# Patient Record
Sex: FEMALE | Race: WHITE | Employment: OTHER | URBAN - METROPOLITAN AREA
[De-identification: names, ages, dates, MRNs, and addresses within clinical notes are randomized per-mention and may not be internally consistent; named-entity substitution may affect disease eponyms.]

---

## 2018-01-31 ENCOUNTER — HOSPITAL ENCOUNTER (OUTPATIENT)
Dept: WOUND CARE | Age: 63
Discharge: HOME | End: 2018-01-31
Attending: PODIATRIST | Admitting: PODIATRIST

## 2018-01-31 VITALS — DIASTOLIC BLOOD PRESSURE: 78 MMHG | TEMPERATURE: 97.4 F | HEART RATE: 85 BPM | SYSTOLIC BLOOD PRESSURE: 107 MMHG

## 2018-01-31 RX ORDER — CLINDAMYCIN PHOSPHATE 10 MG/G
GEL TOPICAL 2 TIMES DAILY
COMMUNITY
End: 2018-04-27

## 2018-01-31 RX ORDER — GINSENG 100 MG
CAPSULE ORAL 2 TIMES DAILY
COMMUNITY
End: 2019-01-03 | Stop reason: ALTCHOICE

## 2018-01-31 RX ORDER — ACETAMINOPHEN 650 MG
TABLET, EXTENDED RELEASE ORAL
Qty: 59 BOTTLE | Refills: 1 | Status: SHIPPED | OUTPATIENT
Start: 2018-01-31 | End: 2018-01-31 | Stop reason: HOSPADM

## 2018-01-31 RX ORDER — ACETAMINOPHEN 650 MG
TABLET, EXTENDED RELEASE ORAL
Qty: 59 BOTTLE | Refills: 1 | Status: SHIPPED | OUTPATIENT
Start: 2018-01-31 | End: 2018-02-01

## 2018-01-31 NOTE — PROGRESS NOTES
Toña   Progress Note and Procedure Note      Ludwig Barbosa  AGE: 65 y.o. GENDER: female  : 1955  TODAY'S DATE:  2018    Subjective:     Chief Complaint   Patient presents with    Wound Check     left foot heel >30Days         HISTORY of PRESENT ILLNESS HPI     Ludwig Barbosa is a 65 y.o. female who presents today for wound evaluation. History of Wound: Patient is brought here today by a caregiver. They noticed a new wound in the past week. I think it's from being in bed without wearing the offloading boots or keeping the heel off the bed. Wound Pain:  none  Severity:  0 / 10   Wound Type:  pressure and traumatic  Modifying Factors:  venous stasis, lymphedema and decreased mobility  Associated Signs/Symptoms:  edema        PAST MEDICAL HISTORY        Diagnosis Date    Cerebral palsy (HCC)     Constipation     Dysphagia     mechanical soft diet, nectar thick liquids    Fracture     left leg brace per sister 2014    Intractable epilepsy ([facility] [place] [address]     Mental retardation     Osteoporosis     Recurrent UTI     Seizure disorder ([facility] [place] [address]     seizures well controlled( last seizure )    Wheelchair bound        ALLERGIES    Allergies   Allergen Reactions    Macrobid [Nitrofurantoin Monohydrate Macrocrystals]     Neosporin [Neomycin-Polymyxin-Gramicidin]     Nitrofurantoin     Nitrofurantoin Macrocrystal     Penicillins     Sulfa Antibiotics        MEDICATIONS    Current Outpatient Prescriptions on File Prior to Encounter   Medication Sig Dispense Refill    ferrous sulfate 325 (65 FE) MG tablet Take 325 mg by mouth daily (with breakfast)      omeprazole (PRILOSEC) 40 MG capsule Take 40 mg by mouth daily.  calcium carbonate (OSCAL) 500 MG TABS tablet Take 500 mg by mouth 2 times daily.  Potassium Chloride ER 20 MEQ TBCR Take 20 mEq by mouth daily.       citalopram (CELEXA) 10 MG tablet Take 20 mg by mouth daily       divalproex (DEPAKOTE SPRINKLE) 125 MG capsule Take 2 capsules by mouth 2 times daily. 60 capsule 1    diazepam (DIASTAT ACUDIAL) 10 MG GEL Place  rectally once as needed. UP TO 15 MG RECTALLY PRN, any seizure greater than 5 min       chlorhexidine (PERIDEX) 0.12 % solution Take 15 mLs by mouth 2 times daily.  furosemide (LASIX) 40 MG tablet Take 40 mg by mouth daily.  acyclovir (ZOVIRAX) 5 % ointment Apply topically every 3 hours Apply topically every 3 hours.  dicyclomine (BENTYL) 20 MG tablet Take 20 mg by mouth 2 times daily.  guaiFENesin (MUCINEX) 600 MG SR tablet Take 1,200 mg by mouth every 12 hours as needed for Congestion.  ibuprofen (IBU) 600 MG tablet Take 1 tablet by mouth every 8 hours as needed for Pain. 20 tablet 0    acetaminophen (TYLENOL) 325 MG tablet Take 650 mg by mouth every 6 hours as needed.  NYSTATIN IN AQUAPHOR OINTMENT Apply  topically 2 times daily as needed.  Dextromethorphan-Guaifenesin (ROBITUSSIN DM PO) Take 10 mLs by mouth every 4 hours as needed.  docusate sodium (COLACE) 100 MG capsule Take 100 mg by mouth 2 times daily.  polyethylene glycol (GLYCOLAX) powder Take 17 g by mouth 2 times daily. MIX 1 TABLESPOON IN JUICE       No current facility-administered medications on file prior to encounter. REVIEW OF SYSTEMS    Pertinent items are noted in HPI. Objective:        /78   Pulse 85   Temp 97.4 °F (36.3 °C) (Oral)     PHYSICAL EXAM    Vascular: Vascular status Intact  palpable pedal pulses, right DP1/4 and PT1/4, left DP1/4 and PT1/4. CFT 3 seconds digits 1 to 5 bilateral.  Hair growthAbsent  both lower extremities and feet. Skin temperature is warm to warm from pretibial area to distal digits bilateral.  Exam is negative for rubor, pallor, cyanosis or signs of acute vascular compromise bilaterally. Exam is positive for edema bilateral lower extremity. Varicosities Absent  bilateral lower extremity.    Neuro: Neurologic status Difficult to assess due to patient's mental status. Patient does respond to touch. There were no reproducible neuritic symptoms on exam bilateral feet/ankles. Derm: Ulceration to left Foot. Ecchymosis Absent  bilateral feet/foot. Musculoskeletal: no discomfort with debridement of wound 5/5 muscle strength in/eversion and dorsi/plantarflexion bilateral feet. No gross instability noted. Wound Care Documentation:  Wound 01/31/18 Other (Comment) Heel Left #2 (Active)   Wound Image   1/31/2018  2:56 PM   Wound Type Wound 1/31/2018  2:56 PM   Wound Pressure Stage  2 1/31/2018  2:56 PM   Dressing/Treatment Dry dressing; Foam 1/31/2018  2:56 PM   Wound Cleansed Rinsed/Irrigated with saline 1/31/2018  2:56 PM   Wound Length (cm) 0.7 cm 1/31/2018  2:56 PM   Wound Width (cm) 1.2 cm 1/31/2018  2:56 PM   Wound Depth (cm)  0.1 1/31/2018  2:56 PM   Calculated Wound Size (cm^2) (l*w) 0.84 cm^2 1/31/2018  2:56 PM   Wound Assessment Pink 1/31/2018  2:56 PM   Drainage Amount None 1/31/2018  2:56 PM   Sharla-wound Assessment Purple 1/31/2018  2:56 PM   [place]%Wound Bed 100 1/31/2018  2:56 PM   Red%Wound Bed 0 1/31/2018  2:56 PM   Yellow%Wound Bed 0 1/31/2018  2:56 PM   Black%Wound Bed 0 1/31/2018  2:56 PM   Purple%Wound Bed 0 1/31/2018  2:56 PM   Other%Wound Bed 0 1/31/2018  2:56 PM   Op First Treatment Date 01/31/18 1/31/2018  2:56 PM   Number of days: 0     Assessment: Pressure ulceration posterior heel, mild PAD  Plan:   Dry necrosis. Wound is stable  Follow-up 1 week  Dry sterile dressing with Betadine ointment  Recommend foam over anterior ankle when wearing Ace wrap.     Written Patient Discharge Instructions Given            Electronically signed by Maren Morton DPM on 1/31/2018 at 5:13 PM

## 2018-02-01 RX ORDER — ACETAMINOPHEN 650 MG
1 TABLET, EXTENDED RELEASE ORAL DAILY
COMMUNITY
End: 2018-02-01 | Stop reason: HOSPADM

## 2018-02-01 RX ORDER — ACETAMINOPHEN 650 MG
TABLET, EXTENDED RELEASE ORAL
Qty: 1 BOTTLE | Refills: 1 | Status: SHIPPED | OUTPATIENT
Start: 2018-02-01 | End: 2018-02-08

## 2018-02-09 ENCOUNTER — HOSPITAL ENCOUNTER (OUTPATIENT)
Dept: WOUND CARE | Age: 63
Discharge: HOME | End: 2018-02-09
Attending: PODIATRIST | Admitting: PODIATRIST

## 2018-02-09 VITALS — DIASTOLIC BLOOD PRESSURE: 78 MMHG | HEART RATE: 89 BPM | RESPIRATION RATE: 16 BRPM | SYSTOLIC BLOOD PRESSURE: 118 MMHG

## 2018-02-09 NOTE — PROGRESS NOTES
wearing Ace wrap.     Written Patient Discharge Instructions Given            Electronically signed by Tanna Whatley DPM on 2/9/2018 at 11:42 AM

## 2018-02-16 ENCOUNTER — HOSPITAL ENCOUNTER (OUTPATIENT)
Dept: WOUND CARE | Age: 63
Discharge: HOME | End: 2018-02-16
Attending: PODIATRIST | Admitting: PODIATRIST

## 2018-02-16 VITALS — DIASTOLIC BLOOD PRESSURE: 68 MMHG | SYSTOLIC BLOOD PRESSURE: 107 MMHG | HEART RATE: 75 BPM

## 2018-02-16 RX ORDER — GENTAMICIN SULFATE 1 MG/G
CREAM TOPICAL
Qty: 1 TUBE | Refills: 1 | Status: SHIPPED | OUTPATIENT
Start: 2018-02-16 | End: 2018-04-27

## 2018-02-16 RX ORDER — LIDOCAINE HYDROCHLORIDE 40 MG/ML
SOLUTION TOPICAL ONCE
Status: DISCONTINUED | OUTPATIENT
Start: 2018-02-16 | End: 2018-02-17 | Stop reason: HOSPADM

## 2018-02-16 NOTE — PROGRESS NOTES
[facility]  Progress Note and Procedure Note      Sandoval Escalante  AGE: 65 y.o. GENDER: female  : 1955  TODAY'S DATE:  2018    Subjective:     Chief Complaint   Patient presents with    Wound Check     left foot F/U         HISTORY of PRESENT ILLNESS HPI     Sandoval Shown is a 65 y.o. female who presents today for wound evaluation. History of Wound: Patient is brought here today by a caregiver. They have been using the offloading boot as directed. They've been changing the bandage every day with application of Betadine paint. There been no issues. The patient is not in distress. Wound Pain:  none  Severity:  0 / 10   Wound Type:  pressure and traumatic  Modifying Factors:  venous stasis, lymphedema and decreased mobility  Associated Signs/Symptoms:  edema        PAST MEDICAL HISTORY        Diagnosis Date    Cerebral palsy (HCC)     Constipation     Dysphagia     mechanical soft diet, nectar thick liquids    Fracture     left leg brace per sister 2014    Int[facility] [address]     Mental retardation     Osteoporosis     Recurrent UTI     Seizure disorder ([facility] [address]     seizures well controlled( last seizure )    Wheelchair bound        ALLERGIES    Allergies   Allergen Reactions    Macrobid [Nitrofurantoin Monohydrate Macrocrystals]     Neosporin [Neomycin-Polymyxin-Gramicidin]     Nitrofurantoin     Nitrofurantoin Macrocrystal     Penicillins     Sulfa Antibiotics        MEDICATIONS    Current Outpatient Prescriptions on File Prior to Encounter   Medication Sig Dispense Refill    clindamycin (CLINDAGEL) 1 % gel Apply topically 2 times daily Apply topically 2 times daily.  bacitracin 500 UNIT/GM ointment Apply topically 2 times daily Apply topically 2 times daily.       ferrous sulfate 325 (65 FE) MG tablet Take 325 mg by mouth daily (with breakfast)      acyclovir (ZOVIRAX) 5 % ointment Apply topically every 3 hours Apply topically every 3 hours.  dicyclomine (BENTYL) 20 MG tablet Take 20 mg by mouth 2 times daily.  omeprazole (PRILOSEC) 40 MG capsule Take 40 mg by mouth daily.  calcium carbonate (OSCAL) 500 MG TABS tablet Take 500 mg by mouth 2 times daily.  Potassium Chloride ER 20 MEQ TBCR Take 20 mEq by mouth daily.  guaiFENesin (MUCINEX) 600 MG SR tablet Take 1,200 mg by mouth every 12 hours as needed for Congestion.  citalopram (CELEXA) 10 MG tablet Take 20 mg by mouth daily       divalproex (DEPAKOTE SPRINKLE) 125 MG capsule Take 2 capsules by mouth 2 times daily. 60 capsule 1    NYSTATIN IN AQUAPHOR OINTMENT Apply  topically 2 times daily as needed.  Dextromethorphan-Guaifenesin (ROBITUSSIN DM PO) Take 10 mLs by mouth every 4 hours as needed.  diazepam (DIASTAT ACUDIAL) 10 MG GEL Place  rectally once as needed. UP TO 15 MG RECTALLY PRN, any seizure greater than 5 min       chlorhexidine (PERIDEX) 0.12 % solution Take 15 mLs by mouth 2 times daily.  docusate sodium (COLACE) 100 MG capsule Take 100 mg by mouth 2 times daily.  furosemide (LASIX) 40 MG tablet Take 40 mg by mouth daily.  polyethylene glycol (GLYCOLAX) powder Take 17 g by mouth 2 times daily. MIX 1 TABLESPOON IN JUICE      ibuprofen (IBU) 600 MG tablet Take 1 tablet by mouth every 8 hours as needed for Pain. 20 tablet 0    acetaminophen (TYLENOL) 325 MG tablet Take 650 mg by mouth every 6 hours as needed. No current facility-administered medications on file prior to encounter. REVIEW OF SYSTEMS    Pertinent items are noted in HPI. Objective:        /68   Pulse 75     PHYSICAL EXAM    Vascular: Vascular status Intact  palpable pedal pulses, right DP1/4 and PT1/4, left DP1/4 and PT1/4. CFT 3 seconds digits 1 to 5 bilateral.  Hair growthAbsent  both lower extremities and feet.   Skin temperature is warm to warm from pretibial area to distal digits bilateral.  Exam is negative for rubor, Yellow%Wound Bed 0 2/16/2018 10:43 AM   Black%Wound Bed 100 2/16/2018 10:43 AM   Purple%Wound Bed 0 2/16/2018 10:43 AM   Other%Wound Bed 0 2/16/2018 10:43 AM   Time out Yes 2/16/2018 10:54 AM   Op First Treatment Date 01/31/18 1/31/2018  2:56 PM   Number of days: 15       Total Surface Area Debrided:  3.78 sq cm     Percentage of wound debrided 100%    Bleeding:  Minimal    Hemostasis Achieved:  by pressure    Procedural Pain:  3  / 10     Post Procedural Pain:  0 / 10     Response to treatment:  Well tolerated by patient. Assessment: Pressure ulceration posterior heel, mild PAD  Plan:   Dry necrosis. Wound debrided today   Some bloody drainage noted. Gentamicin cream to heal with bandage daily   Recommend foam over anterior ankle when wearing Ace wrap.   Continue offloading to the heel  Follow-up in 1 week  Written Patient Discharge Instructions Given   daily dressing changes         Electronically signed by Lowell Ortega DPM on 2/16/2018 at 11:00 AM

## 2018-02-23 ENCOUNTER — HOSPITAL ENCOUNTER (OUTPATIENT)
Dept: WOUND CARE | Age: 63
Discharge: HOME | End: 2018-02-23
Attending: PODIATRIST | Admitting: PODIATRIST

## 2018-02-23 VITALS — TEMPERATURE: 98 F | DIASTOLIC BLOOD PRESSURE: 65 MMHG | HEART RATE: 94 BPM | SYSTOLIC BLOOD PRESSURE: 110 MMHG

## 2018-02-23 NOTE — PROGRESS NOTES
pretibial area to distal digits bilateral.  Exam is negative for rubor, pallor, cyanosis or signs of acute vascular compromise bilaterally. Exam is positive for edema bilateral lower extremity. Varicosities Absent  bilateral lower extremity. Neuro: Neurologic status Difficult to assess due to patient's mental status. Patient does respond to touch. There were no reproducible neuritic symptoms on exam bilateral feet/ankles. Derm: Ulceration to left Foot. Ecchymosis Absent  bilateral feet/foot. Musculoskeletal: no discomfort with debridement of wound 5/5 muscle strength in/eversion and dorsi/plantarflexion bilateral feet. No gross instability noted. Wound 01/31/18 Other (Comment) Heel Left #2 (Active)   Wound Image   1/31/2018  2:56 PM   Wound Type Wound 2/23/2018 11:01 AM   Wound Pressure Stage  2 2/23/2018 11:01 AM   Dressing/Treatment Foam;Dry dressing; Pharmaceutical agent (see eMar) 2/16/2018 11:16 AM   Wound Cleansed Rinsed/Irrigated with saline 2/23/2018 11:01 AM   Wound Length (cm) 0 cm 2/23/2018 11:01 AM   Wound Width (cm) 0 cm 2/23/2018 11:01 AM   Wound Depth (cm)  0 2/23/2018 11:01 AM   Calculated Wound Size (cm^2) (l*w) 0 cm^2 2/23/2018 11:01 AM   Change in Wound Size % (l*w) 100 2/23/2018 11:01 AM   Wound Assessment Dry;Black 2/23/2018 11:01 AM   Drainage Amount None 2/23/2018 11:01 AM   Odor None 2/16/2018 10:43 AM   Sharla-wound Assessment Pink 2/23/2018 11:01 AM   [place]%Wound Bed 0 2/23/2018 11:01 AM   Red%Wound Bed 0 2/23/2018 11:01 AM   Yellow%Wound Bed 0 2/23/2018 11:01 AM   Black%Wound Bed 100 2/23/2018 11:01 AM   Purple%Wound Bed 0 2/23/2018 11:01 AM   Other%Wound Bed 0 2/23/2018 11:01 AM   Time out Yes 2/16/2018 10:54 AM   Op First Treatment Date 01/31/18 1/31/2018  2:56 PM   Number of days: 22         Assessment: Pressure ulceration posterior heel, mild PAD  Plan:   Dry necrosis. Wound debrided today   Some bloody drainage noted.     Gentamicin cream to heal with bandage daily

## 2018-04-27 ENCOUNTER — HOSPITAL ENCOUNTER (OUTPATIENT)
Dept: WOUND CARE | Age: 63
Discharge: HOME | End: 2018-04-27
Attending: PODIATRIST | Admitting: PODIATRIST

## 2018-04-27 VITALS — HEART RATE: 87 BPM | TEMPERATURE: 97.6 F | DIASTOLIC BLOOD PRESSURE: 76 MMHG | SYSTOLIC BLOOD PRESSURE: 116 MMHG

## 2018-04-27 RX ORDER — GENTAMICIN SULFATE 1 MG/G
CREAM TOPICAL
Qty: 1 TUBE | Refills: 1 | Status: SHIPPED | OUTPATIENT
Start: 2018-04-27 | End: 2019-01-03 | Stop reason: ALTCHOICE

## 2018-04-27 RX ORDER — LIDOCAINE HYDROCHLORIDE 40 MG/ML
SOLUTION TOPICAL ONCE
Status: DISCONTINUED | OUTPATIENT
Start: 2018-04-27 | End: 2018-04-28 | Stop reason: HOSPADM

## 2018-05-17 ENCOUNTER — HOSPITAL ENCOUNTER (OUTPATIENT)
Dept: WOUND CARE | Age: 63
Discharge: HOME | End: 2018-05-17
Attending: INTERNAL MEDICINE | Admitting: INTERNAL MEDICINE

## 2018-05-17 VITALS — SYSTOLIC BLOOD PRESSURE: 112 MMHG | HEART RATE: 79 BPM | DIASTOLIC BLOOD PRESSURE: 69 MMHG

## 2018-05-17 DIAGNOSIS — L89.622 DECUBITUS ULCER OF LEFT HEEL, STAGE 2 (HCC): ICD-10-CM

## 2018-05-17 DIAGNOSIS — S31.109A OPEN WOUND OF ANTERIOR ABDOMINAL WALL, INITIAL ENCOUNTER: ICD-10-CM

## 2018-05-17 PROCEDURE — 11042 DBRDMT SUBQ TIS 1ST 20SQCM/<: CPT | Performed by: INTERNAL MEDICINE

## 2018-05-17 RX ORDER — LIDOCAINE HYDROCHLORIDE 40 MG/ML
SOLUTION TOPICAL ONCE
Status: DISCONTINUED | OUTPATIENT
Start: 2018-05-17 | End: 2018-05-18 | Stop reason: HOSPADM

## 2018-05-23 ENCOUNTER — HOSPITAL ENCOUNTER (OUTPATIENT)
Dept: WOUND CARE | Age: 63
Discharge: HOME | End: 2018-05-23
Attending: SURGERY | Admitting: SURGERY

## 2018-05-23 PROCEDURE — 11042 DBRDMT SUBQ TIS 1ST 20SQCM/<: CPT | Performed by: SURGERY

## 2018-05-23 RX ORDER — LIDOCAINE HYDROCHLORIDE 40 MG/ML
SOLUTION TOPICAL ONCE
Status: DISCONTINUED | OUTPATIENT
Start: 2018-05-23 | End: 2018-05-24 | Stop reason: HOSPADM

## 2018-06-13 ENCOUNTER — HOSPITAL ENCOUNTER (OUTPATIENT)
Dept: WOUND CARE | Age: 63
Discharge: HOME | End: 2018-06-13
Attending: SURGERY | Admitting: SURGERY

## 2018-06-13 VITALS
HEART RATE: 74 BPM | DIASTOLIC BLOOD PRESSURE: 52 MMHG | SYSTOLIC BLOOD PRESSURE: 91 MMHG | RESPIRATION RATE: 18 BRPM | TEMPERATURE: 97.5 F

## 2018-06-13 DIAGNOSIS — N13.30 HYDRONEPHROSIS: ICD-10-CM

## 2018-06-13 PROCEDURE — 11042 DBRDMT SUBQ TIS 1ST 20SQCM/<: CPT | Performed by: SURGERY

## 2018-06-13 RX ORDER — LIDOCAINE HYDROCHLORIDE 40 MG/ML
SOLUTION TOPICAL ONCE
Status: DISCONTINUED | OUTPATIENT
Start: 2018-06-13 | End: 2018-06-14 | Stop reason: HOSPADM

## 2018-06-26 ENCOUNTER — HOSPITAL ENCOUNTER (OUTPATIENT)
Dept: WOUND CARE | Age: 63
Discharge: HOME | End: 2018-06-26
Attending: SURGERY | Admitting: SURGERY

## 2018-06-26 VITALS — DIASTOLIC BLOOD PRESSURE: 61 MMHG | HEART RATE: 70 BPM | SYSTOLIC BLOOD PRESSURE: 100 MMHG

## 2018-06-26 DIAGNOSIS — N13.30 HYDRONEPHROSIS: ICD-10-CM

## 2018-06-26 PROCEDURE — 99212 OFFICE O/P EST SF 10 MIN: CPT | Performed by: SURGERY

## 2018-06-26 RX ORDER — LIDOCAINE HYDROCHLORIDE 40 MG/ML
SOLUTION TOPICAL ONCE
Status: DISCONTINUED | OUTPATIENT
Start: 2018-06-26 | End: 2018-06-27 | Stop reason: HOSPADM

## 2018-06-27 ENCOUNTER — HOSPITAL ENCOUNTER (OUTPATIENT)
Dept: CT IMAGING | Age: 63
Discharge: HOME | End: 2018-06-27
Attending: FAMILY MEDICINE | Admitting: FAMILY MEDICINE

## 2018-06-27 DIAGNOSIS — N13.30 HYDRONEPHROSIS, UNSPECIFIED HYDRONEPHROSIS TYPE: ICD-10-CM

## 2018-06-27 DIAGNOSIS — N13.30 HYDRONEPHROSIS: ICD-10-CM

## 2018-07-19 ENCOUNTER — HOSPITAL ENCOUNTER (OUTPATIENT)
Dept: WOUND CARE | Age: 63
Discharge: HOME | End: 2018-07-19
Attending: INTERNAL MEDICINE | Admitting: INTERNAL MEDICINE

## 2018-07-19 DIAGNOSIS — N13.30 HYDRONEPHROSIS: ICD-10-CM

## 2018-07-19 PROCEDURE — 11042 DBRDMT SUBQ TIS 1ST 20SQCM/<: CPT | Performed by: INTERNAL MEDICINE

## 2018-07-19 RX ORDER — LIDOCAINE HYDROCHLORIDE 40 MG/ML
SOLUTION TOPICAL ONCE
Status: DISCONTINUED | OUTPATIENT
Start: 2018-07-19 | End: 2018-07-20 | Stop reason: HOSPADM

## 2018-07-19 NOTE — PROGRESS NOTES
visit.    PHYSICAL EXAM    General Appearance: alert and oriented to person, place and time, well-developed and well-nourished, in no acute distress  Skin: warm and dry  Head: normocephalic and atraumatic  Eyes: extraocular eye movements intact and conjunctivae normal  Extremities: no cyanosis and no clubbing  Musculoskeletal: normal range of motion, no joint swelling, deformity or tenderness      Assessment:     Patient Active Problem List   Diagnosis    Dental disorder    Mental retardation    Cerebral palsy ([address]    Seizure disorder ([address]    Osteoporosis    Dysphagia    Status epilepticus, generalized convulsive ([address]    [facility]est[facility]) [facility]    Decubitus ulcer of left heel, stage 2    Wound, open, abdominal wall, anterior    Decubitus ulcer of left heel, stage 3 ([address]    Open wound of left heel       Procedure Note    Performed by: Otto Sharp DO    Consent obtained: Yes    Time out taken:  Yes    Pain Control: Anesthetic  Anesthetic: 4% Lidocaine Liquid Topical     Debridement:Excisional Debridement    Using curette the wound was sharply debrided    down through and including the removal of subcutaneous tissue.         Devitalized Tissue Debrided:  biofilm and slough    Pre Debridement Measurements:  Are located in the Wound Documentation Flow Sheet    Wound #: 3     Post  Debridement Measurements:  Wound 01/31/18 Heel Left #3 (Active)   Wound Image   6/26/2018 10:10 AM   Wound Type Incision 6/26/2018 10:10 AM   Wound Pressure Stage  2 6/26/2018 10:10 AM   Wound Cleansed Rinsed/Irrigated with saline 6/26/2018 10:10 AM   Wound Length (cm) 0 cm 6/26/2018 10:10 AM   Wound Width (cm) 0 cm 6/26/2018 10:10 AM   Wound Depth (cm)  0 6/26/2018 10:10 AM   Calculated Wound Size (cm^2) (l*w) 0 cm^2 6/26/2018 10:10 AM   Change in Wound Size % (l*w) 100 6/26/2018 10:10 AM   Wound Assessment Yellow 6/26/2018 10:10 AM   Drainage Amount None 6/26/2018 10:10 AM   Odor None 6/26/2018 10:10 AM Sharla-wound Assessment Black;Calloused 6/26/2018 10:10 AM   [place]%Wound Bed 0 6/26/2018 10:10 AM   Red%Wound Bed 0 6/26/2018 10:10 AM   Yellow%Wound Bed 100 6/26/2018 10:10 AM   Black%Wound Bed 0 6/26/2018 10:10 AM   Purple%Wound Bed 0 6/26/2018 10:10 AM   Other%Wound Bed 0 6/26/2018 10:10 AM   Number of days: 168       Incision 03/05/14 Mouth Lower (Active)   Number of days: 1596           Total Surface Area Debrided:  4 sq cm     Percentage of wound debrided 100%    Bleeding:  Minimal    Hemostasis Achieved:  by pressure    Procedural Pain:  3  / 10     Post Procedural Pain:  1 / 10     Response to treatment:  Well tolerated by patient. Plan:     The nature of the patient's condition was explained in depth. The patient was informed that their compliance to the treatment plan is paramount to successful healing and prevention of further ulceration and/or infection     Discharge Treatment      Written Patient Discharge Instructions Given    Wound: Left Heel     With each dressing change, rinse wounds with 0.9% Saline. (May use wound wash or soft contact solution. Both can be purchased at a local drug store). If unable to obtain saline, may use a gentle soap and water.     Dressing care: Left heel- Santyl, Dry dressing/Heel foam- change daily. Prevalon Boot. Do Not get foot wet in shower. Keep all pressure off of heel. Continue to wear the Prevalon Boot, place orange area to foot    Thank you for allowing me to participate in the care of your patient. Please do not hesitate to call.      Keyshawn Mcdowell D.O., [facility]  Interventional Cardiology     o: [phone]  [facility] [address], [address] [facility]

## 2018-08-03 ENCOUNTER — HOSPITAL ENCOUNTER (OUTPATIENT)
Dept: WOUND CARE | Age: 63
Discharge: HOME | End: 2018-08-03
Attending: PODIATRIST | Admitting: INTERNAL MEDICINE

## 2018-08-03 VITALS
TEMPERATURE: 98.6 F | HEART RATE: 94 BPM | SYSTOLIC BLOOD PRESSURE: 96 MMHG | DIASTOLIC BLOOD PRESSURE: 64 MMHG | RESPIRATION RATE: 18 BRPM

## 2018-08-03 RX ORDER — LIDOCAINE HYDROCHLORIDE 40 MG/ML
SOLUTION TOPICAL ONCE
Status: DISCONTINUED | OUTPATIENT
Start: 2018-08-03 | End: 2018-08-04 | Stop reason: HOSPADM

## 2018-08-10 ENCOUNTER — HOSPITAL ENCOUNTER (OUTPATIENT)
Dept: WOUND CARE | Age: 63
Discharge: HOME | End: 2018-08-10
Attending: PODIATRIST | Admitting: PODIATRIST

## 2018-08-10 VITALS — DIASTOLIC BLOOD PRESSURE: 60 MMHG | HEART RATE: 71 BPM | SYSTOLIC BLOOD PRESSURE: 102 MMHG | RESPIRATION RATE: 18 BRPM

## 2018-08-10 RX ORDER — LIDOCAINE HYDROCHLORIDE 40 MG/ML
SOLUTION TOPICAL ONCE
Status: DISCONTINUED | OUTPATIENT
Start: 2018-08-10 | End: 2018-08-11 | Stop reason: HOSPADM

## 2018-08-10 NOTE — PLAN OF CARE
Problem: Wound:  Goal: Will show signs of wound healing; wound closure and no evidence of infection  Will show signs of wound healing; wound closure and no evidence of infection   Outcome: Ongoing  Discharge instructions given. Patient verbalized understanding. Return to [facility] in 1 week.   Called/faxed orders to  [facility]    [] antibiotics    [] X-ray     [] Culture   [x] Debridement      [] HBO Evaluation    [] LABS   [] Vascular Studies []

## 2018-08-10 NOTE — PROGRESS NOTES
Yellow%Wound Bed 0 8/10/2018 10:55 AM   Black%Wound Bed 0 8/10/2018 10:55 AM   Purple%Wound Bed 0 8/10/2018 10:55 AM   Other%Wound Bed 0 8/10/2018 10:55 AM   Time out Yes 8/10/2018 11:19 AM   Op First Treatment Date 07/19/18 7/19/2018 10:22 AM   Number of days: 191       Incision 03/05/14 Mouth Lower (Active)   Number of days: 1619         Total Surface Area Debrided: 1.4sq cm     Percentage of wound debrided 100%    Bleeding:  Minimal    Hemostasis Achieved:  by pressure    Procedural Pain:  0  / 10     Post Procedural Pain:  0 / 10     Response to treatment:  Well tolerated by patient. Assessment: Pressure ulceration posterior heel, mild PAD  Plan:   Dry necrosis. Wound debrided today   Some bloody drainage noted.     Wound is granular And macerated, stopped Santyl  Collagen to wound every other day  foam over anterior ankle when wearing Ace wrap.  prevalon boot   Continue offloading to the heel  Follow-up in 1 week  Written Patient Discharge Instructions Given   daily dressing changes         Electronically signed by Betsy Rao DPM on 8/10/2018 at 5:05 PM

## 2018-08-31 ENCOUNTER — HOSPITAL ENCOUNTER (OUTPATIENT)
Dept: WOUND CARE | Age: 63
Discharge: HOME | End: 2018-08-31
Attending: PODIATRIST | Admitting: PODIATRIST

## 2018-08-31 RX ORDER — LIDOCAINE HYDROCHLORIDE 40 MG/ML
SOLUTION TOPICAL ONCE
Status: DISCONTINUED | OUTPATIENT
Start: 2018-08-31 | End: 2018-09-01 | Stop reason: HOSPADM

## 2018-08-31 NOTE — PROGRESS NOTES
mouth daily.  calcium carbonate (OSCAL) 500 MG TABS tablet Take 500 mg by mouth 2 times daily.  Potassium Chloride ER 20 MEQ TBCR Take 20 mEq by mouth daily.  guaiFENesin (MUCINEX) 600 MG SR tablet Take 1,200 mg by mouth every 12 hours as needed for Congestion.  citalopram (CELEXA) 10 MG tablet Take 20 mg by mouth daily       ibuprofen (IBU) 600 MG tablet Take 1 tablet by mouth every 8 hours as needed for Pain. 20 tablet 0    acetaminophen (TYLENOL) 325 MG tablet Take 650 mg by mouth every 6 hours as needed.  divalproex (DEPAKOTE SPRINKLE) 125 MG capsule Take 2 capsules by mouth 2 times daily. 60 capsule 1    NYSTATIN IN AQUAPHOR OINTMENT Apply  topically 2 times daily as needed.  Dextromethorphan-Guaifenesin (ROBITUSSIN DM PO) Take 10 mLs by mouth every 4 hours as needed.  diazepam (DIASTAT ACUDIAL) 10 MG GEL Place  rectally once as needed. UP TO 15 MG RECTALLY PRN, any seizure greater than 5 min       chlorhexidine (PERIDEX) 0.12 % solution Take 15 mLs by mouth 2 times daily.  docusate sodium (COLACE) 100 MG capsule Take 100 mg by mouth 2 times daily.  furosemide (LASIX) 40 MG tablet Take 40 mg by mouth daily.  polyethylene glycol (GLYCOLAX) powder Take 17 g by mouth 2 times daily. MIX 1 TABLESPOON IN JUICE       No current facility-administered medications on file prior to encounter. REVIEW OF SYSTEMS    Pertinent items are noted in HPI. Objective: There were no vitals taken for this visit. PHYSICAL EXAM    Vascular: Vascular status Intact  palpable pedal pulses, right DP1/4 and PT1/4, left DP1/4 and PT1/4. CFT 3 seconds digits 1 to 5 bilateral.  Hair growthAbsent  both lower extremities and feet. Skin temperature is warm to warm from pretibial area to distal digits bilateral.  Exam is negative for rubor, pallor, cyanosis or signs of acute vascular compromise bilaterally. Exam is positive for edema bilateral lower extremity. Black%Wound Bed 0 8/31/2018 10:53 AM   Purple%Wound Bed 0 8/31/2018 10:53 AM   Other%Wound Bed 0 8/31/2018 10:53 AM   Time out Yes 8/31/2018 11:22 AM   Op First Treatment Date 07/19/18 7/19/2018 10:22 AM   Number of days: 211       Incision 03/05/14 Mouth Lower (Active)   Number of days: 1640         Total Surface Area Debrided: 0.09sq cm     Percentage of wound debrided 100%    Bleeding:  Minimal    Hemostasis Achieved:  by pressure    Procedural Pain:  0  / 10     Post Procedural Pain:  0 / 10     Response to treatment:  Well tolerated by patient. Assessment: Pressure ulceration posterior heel, mild PAD    Plan:   Dry necrosis.   Wound debrided today   Wound is granular and smaller  Collagen to wound every other day  foam over anterior ankle when wearing Ace wrap.  prevalon boot   Continue offloading to the heel  Follow-up in 1 week  Written Patient Discharge Instructions Given   daily dressing changes         Electronically signed by Jessica Waldrop DPM on 8/31/2018 at 12:33 PM

## 2018-09-14 ENCOUNTER — HOSPITAL ENCOUNTER (OUTPATIENT)
Dept: WOUND CARE | Age: 63
Discharge: HOME | End: 2018-09-14
Attending: PODIATRIST | Admitting: PODIATRIST

## 2018-09-28 ENCOUNTER — HOSPITAL ENCOUNTER (OUTPATIENT)
Dept: WOUND CARE | Age: 63
Discharge: HOME | End: 2018-09-28
Payer: MEDICARE

## 2018-09-28 VITALS — HEART RATE: 72 BPM | DIASTOLIC BLOOD PRESSURE: 69 MMHG | SYSTOLIC BLOOD PRESSURE: 107 MMHG

## 2018-09-28 PROCEDURE — 11043 DBRDMT MUSC&/FSCA 1ST 20/<: CPT

## 2018-09-28 RX ORDER — LIDOCAINE HYDROCHLORIDE 40 MG/ML
SOLUTION TOPICAL ONCE
Status: DISCONTINUED | OUTPATIENT
Start: 2018-09-28 | End: 2018-09-29 | Stop reason: HOSPADM

## 2018-09-28 NOTE — PLAN OF CARE
Problem: Wound:  Goal: Will show signs of wound healing; wound closure and no evidence of infection  Will show signs of wound healing; wound closure and no evidence of infection   Outcome: Ongoing  Discharge instructions given. Patient verbalized understanding. Return to [facility] in 1 week.   Facility to order [employer]    [] antibiotics    [] X-ray     [] Culture   [x] Debridement      [] HBO Evaluation    [] LABS   [] Vascular Studies []

## 2018-09-28 NOTE — PROGRESS NOTES
[facility]  Progress Note and Procedure Note      Renetta Valladares  AGE: 65 y.o. GENDER: female  : 1955  TODAY'S DATE:  2018    Subjective:     Chief Complaint   Patient presents with    Wound Check     left foot  F/U         HISTORY of PRESENT ILLNESS HPI     Renetta Valladares is a 65 y.o. female who presents today for wound evaluation. History of Wound: Patient is brought here today by a caregiver. She states the wound nurses have been changing the bandage every other day. And that the wound is worse. There is concern that the offloading boot is not working anymore. Wound Pain:  none  Severity:  0 / 10   Wound Type:  pressure and traumatic  Modifying Factors:  venous stasis, lymphedema and decreased mobility  Associated Signs/Symptoms:  edema        PAST MEDICAL HISTORY        Diagnosis Date    Cerebral palsy (HCC)     Constipation     Dysphagia     mechanical soft diet, nectar thick liquids    Fracture     left leg brace per sister 2014    Intractable epilepsy ([facility] [address]     Mental retardation     Osteoporosis     Recurrent UTI     Seizure disorder ([facility] [address]     seizures well controlled( last seizure )    Wheelchair bound        ALLERGIES    Allergies   Allergen Reactions    Macrobid [Nitrofurantoin Monohydrate Macrocrystals]     Neosporin [Neomycin-Polymyxin-Gramicidin]     Nitrofurantoin     Nitrofurantoin Macrocrystal     Penicillins     Sulfa Antibiotics        MEDICATIONS    Current Outpatient Prescriptions on File Prior to Encounter   Medication Sig Dispense Refill    gentamicin (GARAMYCIN) 0.1 % cream Apply topically daily. 1 Tube 1    bacitracin 500 UNIT/GM ointment Apply topically 2 times daily Apply topically 2 times daily.  ferrous sulfate 325 (65 FE) MG tablet Take 325 mg by mouth daily (with breakfast)      acyclovir (ZOVIRAX) 5 % ointment Apply topically every 3 hours Apply topically every 3 hours.       dicyclomine (BENTYL) 20 MG tablet Take 20 mg by mouth 2 times daily.  omeprazole (PRILOSEC) 40 MG capsule Take 40 mg by mouth daily.  calcium carbonate (OSCAL) 500 MG TABS tablet Take 500 mg by mouth 2 times daily.  Potassium Chloride ER 20 MEQ TBCR Take 20 mEq by mouth daily.  guaiFENesin (MUCINEX) 600 MG SR tablet Take 1,200 mg by mouth every 12 hours as needed for Congestion.  citalopram (CELEXA) 10 MG tablet Take 20 mg by mouth daily       divalproex (DEPAKOTE SPRINKLE) 125 MG capsule Take 2 capsules by mouth 2 times daily. 60 capsule 1    NYSTATIN IN AQUAPHOR OINTMENT Apply  topically 2 times daily as needed.  Dextromethorphan-Guaifenesin (ROBITUSSIN DM PO) Take 10 mLs by mouth every 4 hours as needed.  diazepam (DIASTAT ACUDIAL) 10 MG GEL Place  rectally once as needed. UP TO 15 MG RECTALLY PRN, any seizure greater than 5 min       chlorhexidine (PERIDEX) 0.12 % solution Take 15 mLs by mouth 2 times daily.  docusate sodium (COLACE) 100 MG capsule Take 100 mg by mouth 2 times daily.  furosemide (LASIX) 40 MG tablet Take 40 mg by mouth daily.  polyethylene glycol (GLYCOLAX) powder Take 17 g by mouth 2 times daily. MIX 1 TABLESPOON IN JUICE      ibuprofen (IBU) 600 MG tablet Take 1 tablet by mouth every 8 hours as needed for Pain. 20 tablet 0    acetaminophen (TYLENOL) 325 MG tablet Take 650 mg by mouth every 6 hours as needed. No current facility-administered medications on file prior to encounter. REVIEW OF SYSTEMS    Pertinent items are noted in HPI. Objective:        /69   Pulse 72     PHYSICAL EXAM    Vascular: Vascular status Intact  palpable pedal pulses, right DP1/4 and PT1/4, left DP1/4 and PT1/4. CFT 3 seconds digits 1 to 5 bilateral.  Hair growthAbsent  both lower extremities and feet.   Skin temperature is warm to warm from pretibial area to distal digits bilateral.  Exam is negative for rubor, pallor, cyanosis or signs of acute vascular compromise bilaterally. Exam is positive for edema bilateral lower extremity. Varicosities Absent  bilateral lower extremity. Neuro: Neurologic status Difficult to assess due to patient's mental status. Patient does respond to touch. There were no reproducible neuritic symptoms on exam bilateral feet/ankles. Derm: Ulceration to left Foot. Ecchymosis Absent  bilateral feet/foot. Musculoskeletal: no discomfort with debridement of wound 5/5 muscle strength in/eversion and dorsi/plantarflexion bilateral feet. No gross instability noted. Procedure Note    Performed by: Jessica Waldrop DPM    Consent obtained: Yes    Time out taken:  Yes    Pain Control: Anesthetic  Anesthetic: 4% Topical Xylocaine     Debridement:Excisional Debridement    Using curette the wound was sharply debrided    down through and including the removal of epidermis, dermis and subcutaneous tissue. Devitalized Tissue Debrided:  fibrin, slough, necrotic/eschar and exudate    Pre Debridement Measurements:  Are located in the Wound Documentation Flow Sheet    Wound #: 3   Wound Care Documentation:  Wound 01/31/18 Heel Left #3 (Active)   Wound Image   6/26/2018 10:10 AM   Wound Type Wound 9/28/2018 10:51 AM   Wound Pressure Stage  3 9/28/2018 10:51 AM   Dressing Changed Changed/New 5/23/2018 11:27 AM   Dressing/Treatment Dry dressing;Foam;Other (Comment) 8/31/2018 11:22 AM   Wound Cleansed Rinsed/Irrigated with saline 9/28/2018 10:51 AM   Wound Length (cm) 1.2 cm 9/28/2018 11:13 AM   Wound Width (cm) 1.5 cm 9/28/2018 11:13 AM   Wound Depth (cm)  0.1 9/28/2018 11:13 AM   Calculated Wound Size (cm^2) (l*w) 1.8 cm^2 9/28/2018 11:13 AM   Change in Wound Size % (l*w) -114.29 9/28/2018 11:13 AM   Wound Assessment Bleeding 9/28/2018 11:13 AM   Drainage Amount Moderate 9/28/2018 10:51 AM   Drainage Description Serosanguinous; Yellow 9/28/2018 10:51 AM   Odor None 9/28/2018 10:51 AM   Sharla-wound Assessment Fragile;Pink 9/28/2018 10:51 AM

## 2018-10-05 ENCOUNTER — HOSPITAL ENCOUNTER (OUTPATIENT)
Dept: WOUND CARE | Age: 63
Discharge: HOME | End: 2018-10-05

## 2018-10-12 ENCOUNTER — HOSPITAL ENCOUNTER (OUTPATIENT)
Dept: WOUND CARE | Age: 63
Discharge: HOME | End: 2018-10-12
Payer: MEDICARE

## 2018-10-12 VITALS — RESPIRATION RATE: 17 BRPM

## 2018-10-12 PROCEDURE — 11042 DBRDMT SUBQ TIS 1ST 20SQCM/<: CPT

## 2018-10-12 RX ORDER — LIDOCAINE HYDROCHLORIDE 40 MG/ML
SOLUTION TOPICAL ONCE
Status: DISCONTINUED | OUTPATIENT
Start: 2018-10-12 | End: 2018-10-13 | Stop reason: HOSPADM

## 2018-10-26 ENCOUNTER — HOSPITAL ENCOUNTER (OUTPATIENT)
Dept: WOUND CARE | Age: 63
Discharge: HOME | End: 2018-10-26
Payer: MEDICARE

## 2018-10-26 VITALS — HEART RATE: 81 BPM | DIASTOLIC BLOOD PRESSURE: 62 MMHG | SYSTOLIC BLOOD PRESSURE: 109 MMHG

## 2018-10-26 PROCEDURE — 11042 DBRDMT SUBQ TIS 1ST 20SQCM/<: CPT

## 2018-10-26 RX ORDER — LIDOCAINE HYDROCHLORIDE 40 MG/ML
SOLUTION TOPICAL ONCE
Status: DISCONTINUED | OUTPATIENT
Start: 2018-10-26 | End: 2018-10-27 | Stop reason: HOSPADM

## 2018-10-26 NOTE — PLAN OF CARE
Problem: Wound:  Goal: Will show signs of wound healing; wound closure and no evidence of infection  Will show signs of wound healing; wound closure and no evidence of infection   Outcome: Ongoing  Discharge instructions given. Patient verbalized understanding. Return to [facility] in 1 week.   Called/faxed orders to  [facility] looking into new offloading boot    [] antibiotics    [] X-ray     [] Culture   [x] Debridement      [] HBO Evaluation    [] LABS   [] Vascular Studies []

## 2018-10-26 NOTE — PROGRESS NOTES
bilateral lower extremity. Varicosities Absent  bilateral lower extremity. Neuro: Neurologic status Difficult to assess due to patient's mental status. Patient does respond to touch. There were no reproducible neuritic symptoms on exam bilateral feet/ankles. Derm: Ulceration to left Foot. Ecchymosis Absent  bilateral feet/foot. Musculoskeletal: no discomfort with debridement of wound 5/5 muscle strength in/eversion and dorsi/plantarflexion bilateral feet. No gross instability noted. Procedure Note    Performed by: Negar Villarreal DPM    Consent obtained: Yes    Time out taken:  Yes    Pain Control: Anesthetic  Anesthetic: 4% Topical Xylocaine     Debridement:Excisional Debridement    Using curette the wound was sharply debrided    down through and including the removal of epidermis, dermis and subcutaneous tissue.         Devitalized Tissue Debrided:  fibrin, slough, necrotic/eschar and exudate    Pre Debridement Measurements:  Are located in the Wound Documentation Flow Sheet    Wound #: 3   Wound Care Documentation:  Wound 01/31/18 Heel Left #3 (Active)   Wound Image   10/12/2018 11:46 AM   Wound Type Wound 10/26/2018 11:11 AM   Wound Pressure Stage  3 10/26/2018 11:11 AM   Dressing Changed Changed/New 5/23/2018 11:27 AM   Dressing/Treatment Dry dressing;Foam;Other (Comment) 8/31/2018 11:22 AM   Wound Cleansed Rinsed/Irrigated with saline 10/26/2018 11:11 AM   Wound Length (cm) 1.5 cm 10/26/2018 11:46 AM   Wound Width (cm) 0.8 cm 10/26/2018 11:46 AM   Wound Depth (cm)  0.1 10/26/2018 11:46 AM   Calculated Wound Size (cm^2) (l*w) 1.2 cm^2 10/26/2018 11:46 AM   Change in Wound Size % (l*w) -42.86 10/26/2018 11:46 AM   Wound Assessment Bleeding 10/26/2018 11:46 AM   Drainage Amount Small 10/26/2018 11:11 AM   Drainage Description Serosanguinous 10/26/2018 11:11 AM   Odor None 10/26/2018 11:11 AM   Sharla-wound Assessment Dry 10/26/2018 11:11 AM   [place]%Wound Bed 100 10/26/2018 11:11 AM   Red%Wound Bed 0

## 2018-11-09 ENCOUNTER — HOSPITAL ENCOUNTER (OUTPATIENT)
Dept: WOUND CARE | Age: 63
Discharge: HOME | End: 2018-11-09

## 2018-11-14 ENCOUNTER — HOSPITAL ENCOUNTER (OUTPATIENT)
Dept: WOUND CARE | Age: 63
Discharge: HOME | End: 2018-11-14
Attending: SURGERY
Payer: MEDICARE

## 2018-11-14 VITALS — DIASTOLIC BLOOD PRESSURE: 67 MMHG | HEART RATE: 70 BPM | SYSTOLIC BLOOD PRESSURE: 110 MMHG

## 2018-11-14 PROCEDURE — 99213 OFFICE O/P EST LOW 20 MIN: CPT

## 2018-11-14 PROCEDURE — 99212 OFFICE O/P EST SF 10 MIN: CPT | Performed by: SURGERY

## 2018-12-21 ENCOUNTER — HOSPITAL ENCOUNTER (OUTPATIENT)
Age: 63
Setting detail: SPECIMEN
Discharge: HOME | End: 2018-12-21
Payer: MEDICARE

## 2018-12-21 LAB
AMORPHOUS: ABNORMAL /HPF
BACTERIA: ABNORMAL /HPF
BILIRUBIN URINE: NEGATIVE
BLOOD, URINE: ABNORMAL
CLARITY: ABNORMAL
COLOR: YELLOW
EPITHELIAL CELLS, UA: 3 /HPF (ref 0–5)
GLUCOSE URINE: NEGATIVE MG/DL
KETONES, URINE: NEGATIVE MG/DL
LEUKOCYTE ESTERASE, URINE: ABNORMAL
MICROSCOPIC EXAMINATION: YES
NITRITE, URINE: POSITIVE
PH UA: 8
PROTEIN UA: 30 MG/DL
RBC UA: 28 /HPF (ref 0–4)
SPECIFIC GRAVITY UA: 1.02
URINE TYPE: ABNORMAL
UROBILINOGEN, URINE: 0.2 E.U./DL
WBC UA: 304 /HPF (ref 0–5)

## 2018-12-21 PROCEDURE — 87186 SC STD MICRODIL/AGAR DIL: CPT

## 2018-12-21 PROCEDURE — 81001 URINALYSIS AUTO W/SCOPE: CPT

## 2018-12-21 PROCEDURE — 87077 CULTURE AEROBIC IDENTIFY: CPT

## 2018-12-21 PROCEDURE — 87086 URINE CULTURE/COLONY COUNT: CPT

## 2018-12-23 LAB
ORGANISM: ABNORMAL
ORGANISM: ABNORMAL
URINE CULTURE, ROUTINE: ABNORMAL

## 2019-01-01 ENCOUNTER — HOSPITAL ENCOUNTER (OUTPATIENT)
Dept: WOUND CARE | Age: 64
Discharge: HOME | End: 2019-08-22
Payer: MEDICARE

## 2019-01-01 ENCOUNTER — HOSPITAL ENCOUNTER (OUTPATIENT)
Dept: WOUND CARE | Age: 64
Discharge: HOME | End: 2019-08-29
Payer: MEDICARE

## 2019-01-01 ENCOUNTER — HOSPITAL ENCOUNTER (OUTPATIENT)
Dept: WOUND CARE | Age: 64
Discharge: HOME | End: 2019-02-14
Attending: INTERNAL MEDICINE
Payer: MEDICARE

## 2019-01-01 ENCOUNTER — HOSPITAL ENCOUNTER (OUTPATIENT)
Dept: WOUND CARE | Age: 64
Discharge: HOME | End: 2019-06-20
Payer: MEDICARE

## 2019-01-01 ENCOUNTER — HOSPITAL ENCOUNTER (OUTPATIENT)
Dept: WOUND CARE | Age: 64
Discharge: HOME | End: 2019-02-21
Payer: MEDICARE

## 2019-01-01 ENCOUNTER — HOSPITAL ENCOUNTER (OUTPATIENT)
Dept: WOUND CARE | Age: 64
Discharge: HOME | End: 2019-08-15

## 2019-01-01 ENCOUNTER — HOSPITAL ENCOUNTER (OUTPATIENT)
Dept: WOUND CARE | Age: 64
Discharge: HOME | End: 2019-10-03
Payer: MEDICARE

## 2019-01-01 ENCOUNTER — HOSPITAL ENCOUNTER (OUTPATIENT)
Dept: WOUND CARE | Age: 64
Discharge: HOME | End: 2019-05-02
Attending: INTERNAL MEDICINE
Payer: MEDICARE

## 2019-01-01 ENCOUNTER — HOSPITAL ENCOUNTER (OUTPATIENT)
Dept: WOUND CARE | Age: 64
Discharge: HOME | End: 2019-12-12
Payer: MEDICARE

## 2019-01-01 ENCOUNTER — HOSPITAL ENCOUNTER (OUTPATIENT)
Dept: WOUND CARE | Age: 64
Discharge: HOME | End: 2019-09-23
Payer: MEDICARE

## 2019-01-01 ENCOUNTER — HOSPITAL ENCOUNTER (OUTPATIENT)
Dept: WOUND CARE | Age: 64
Discharge: HOME | End: 2019-10-17
Payer: MEDICARE

## 2019-01-01 ENCOUNTER — HOSPITAL ENCOUNTER (OUTPATIENT)
Dept: WOUND CARE | Age: 64
Discharge: HOME | End: 2019-08-08
Payer: MEDICARE

## 2019-01-01 ENCOUNTER — HOSPITAL ENCOUNTER (OUTPATIENT)
Dept: WOUND CARE | Age: 64
Discharge: HOME | End: 2019-03-14
Attending: INTERNAL MEDICINE
Payer: MEDICARE

## 2019-01-01 ENCOUNTER — HOSPITAL ENCOUNTER (OUTPATIENT)
Dept: WOUND CARE | Age: 64
Discharge: HOME | End: 2019-02-28
Payer: MEDICARE

## 2019-01-01 ENCOUNTER — HOSPITAL ENCOUNTER (OUTPATIENT)
Dept: WOUND CARE | Age: 64
Discharge: HOME | End: 2019-11-14
Payer: MEDICARE

## 2019-01-01 ENCOUNTER — HOSPITAL ENCOUNTER (OUTPATIENT)
Dept: WOUND CARE | Age: 64
Discharge: HOME | End: 2019-06-13
Attending: INTERNAL MEDICINE

## 2019-01-01 ENCOUNTER — HOSPITAL ENCOUNTER (OUTPATIENT)
Dept: WOUND CARE | Age: 64
Discharge: HOME | End: 2019-03-21
Attending: INTERNAL MEDICINE
Payer: MEDICARE

## 2019-01-01 ENCOUNTER — HOSPITAL ENCOUNTER (OUTPATIENT)
Dept: WOUND CARE | Age: 64
Discharge: HOME | End: 2019-05-30
Attending: INTERNAL MEDICINE
Payer: MEDICARE

## 2019-01-01 ENCOUNTER — HOSPITAL ENCOUNTER (OUTPATIENT)
Dept: WOUND CARE | Age: 64
Discharge: HOME | End: 2019-02-07
Payer: MEDICARE

## 2019-01-01 ENCOUNTER — HOSPITAL ENCOUNTER (OUTPATIENT)
Dept: WOUND CARE | Age: 64
Discharge: HOME | End: 2019-09-12

## 2019-01-01 ENCOUNTER — HOSPITAL ENCOUNTER (OUTPATIENT)
Dept: WOUND CARE | Age: 64
Discharge: HOME | End: 2019-05-23
Attending: INTERNAL MEDICINE

## 2019-01-01 ENCOUNTER — HOSPITAL ENCOUNTER (OUTPATIENT)
Age: 64
Setting detail: SPECIMEN
Discharge: HOME | End: 2019-02-11
Payer: MEDICARE

## 2019-01-01 ENCOUNTER — HOSPITAL ENCOUNTER (OUTPATIENT)
Dept: WOUND CARE | Age: 64
Discharge: HOME | End: 2019-04-11
Attending: INTERNAL MEDICINE
Payer: MEDICARE

## 2019-01-01 VITALS — SYSTOLIC BLOOD PRESSURE: 94 MMHG | DIASTOLIC BLOOD PRESSURE: 59 MMHG | RESPIRATION RATE: 18 BRPM | HEART RATE: 82 BPM

## 2019-01-01 VITALS — HEART RATE: 99 BPM | RESPIRATION RATE: 18 BRPM | DIASTOLIC BLOOD PRESSURE: 84 MMHG | SYSTOLIC BLOOD PRESSURE: 100 MMHG

## 2019-01-01 VITALS — HEART RATE: 84 BPM | RESPIRATION RATE: 18 BRPM | DIASTOLIC BLOOD PRESSURE: 70 MMHG | SYSTOLIC BLOOD PRESSURE: 112 MMHG

## 2019-01-01 VITALS — TEMPERATURE: 97.1 F | RESPIRATION RATE: 18 BRPM

## 2019-01-01 VITALS — BODY MASS INDEX: 28.2 KG/M2 | WEIGHT: 125.8 LBS

## 2019-01-01 DIAGNOSIS — T14.8XXA OPEN WOUND: ICD-10-CM

## 2019-01-01 DIAGNOSIS — S91.302D OPEN WOUND OF LEFT HEEL, SUBSEQUENT ENCOUNTER: Primary | ICD-10-CM

## 2019-01-01 DIAGNOSIS — S91.302S OPEN WOUND OF LEFT HEEL, SEQUELA: Primary | ICD-10-CM

## 2019-01-01 LAB
GRAM STAIN RESULT: ABNORMAL
ORGANISM: ABNORMAL
PREALBUMIN: 11.1 MG/DL (ref 20–40)
WOUND/ABSCESS: ABNORMAL
WOUND/ABSCESS: ABNORMAL

## 2019-01-01 PROCEDURE — 11042 DBRDMT SUBQ TIS 1ST 20SQCM/<: CPT | Performed by: INTERNAL MEDICINE

## 2019-01-01 PROCEDURE — 99214 OFFICE O/P EST MOD 30 MIN: CPT

## 2019-01-01 PROCEDURE — 11042 DBRDMT SUBQ TIS 1ST 20SQCM/<: CPT

## 2019-01-01 PROCEDURE — 99213 OFFICE O/P EST LOW 20 MIN: CPT | Performed by: INTERNAL MEDICINE

## 2019-01-01 PROCEDURE — 11042 DBRDMT SUBQ TIS 1ST 20SQCM/<: CPT | Performed by: NURSE PRACTITIONER

## 2019-01-01 PROCEDURE — 84134 ASSAY OF PREALBUMIN: CPT

## 2019-01-01 PROCEDURE — 87070 CULTURE OTHR SPECIMN AEROBIC: CPT

## 2019-01-01 PROCEDURE — 87205 SMEAR GRAM STAIN: CPT

## 2019-01-01 PROCEDURE — 99213 OFFICE O/P EST LOW 20 MIN: CPT

## 2019-01-01 PROCEDURE — 87077 CULTURE AEROBIC IDENTIFY: CPT

## 2019-01-01 PROCEDURE — 99212 OFFICE O/P EST SF 10 MIN: CPT | Performed by: INTERNAL MEDICINE

## 2019-01-01 PROCEDURE — 99212 OFFICE O/P EST SF 10 MIN: CPT | Performed by: NURSE PRACTITIONER

## 2019-01-01 PROCEDURE — 36415 COLL VENOUS BLD VENIPUNCTURE: CPT

## 2019-01-01 PROCEDURE — 87186 SC STD MICRODIL/AGAR DIL: CPT

## 2019-01-01 PROCEDURE — 99202 OFFICE O/P NEW SF 15 MIN: CPT | Performed by: NURSE PRACTITIONER

## 2019-01-01 RX ORDER — LIDOCAINE 40 MG/G
CREAM TOPICAL ONCE
Status: DISCONTINUED | OUTPATIENT
Start: 2019-01-01 | End: 2019-01-01 | Stop reason: HOSPADM

## 2019-01-01 RX ORDER — LIDOCAINE HYDROCHLORIDE 40 MG/ML
SOLUTION TOPICAL ONCE
Status: DISCONTINUED | OUTPATIENT
Start: 2019-01-01 | End: 2019-01-01 | Stop reason: HOSPADM

## 2019-01-01 RX ORDER — GENTAMICIN SULFATE 1 MG/G
CREAM TOPICAL
Qty: 1 TUBE | Refills: 0 | Status: SHIPPED | OUTPATIENT
Start: 2019-01-01 | End: 2019-01-01 | Stop reason: HOSPADM

## 2019-01-01 RX ORDER — BETAMETHASONE DIPROPIONATE 0.5 MG/G
CREAM TOPICAL
Qty: 45 G | Refills: 0 | Status: SHIPPED | OUTPATIENT
Start: 2019-01-01 | End: 2019-01-01 | Stop reason: ALTCHOICE

## 2019-01-01 RX ORDER — LIDOCAINE HYDROCHLORIDE 40 MG/ML
SOLUTION TOPICAL ONCE
Status: DISCONTINUED | OUTPATIENT
Start: 2019-01-01 | End: 2019-01-01

## 2019-01-01 RX ORDER — HYDROPHILIC CREAM
1 PASTE (GRAM) TOPICAL DAILY
Qty: 1 TUBE | Refills: 1 | Status: SHIPPED | OUTPATIENT
Start: 2019-01-01

## 2019-01-01 RX ORDER — CEPHALEXIN 500 MG/1
500 CAPSULE ORAL 3 TIMES DAILY
Qty: 21 CAPSULE | Refills: 0 | Status: SHIPPED | OUTPATIENT
Start: 2019-01-01 | End: 2019-01-01

## 2019-01-01 RX ORDER — GENTAMICIN SULFATE 1 MG/G
CREAM TOPICAL
Qty: 1 TUBE | Refills: 0 | Status: SHIPPED | OUTPATIENT
Start: 2019-01-01

## 2019-01-01 ASSESSMENT — PAIN SCALES - GENERAL: PAINLEVEL_OUTOF10: 0

## 2019-01-03 ENCOUNTER — HOSPITAL ENCOUNTER (OUTPATIENT)
Dept: WOUND CARE | Age: 64
Discharge: HOME | End: 2019-01-03
Payer: MEDICARE

## 2019-01-03 PROCEDURE — 99214 OFFICE O/P EST MOD 30 MIN: CPT

## 2019-01-03 PROCEDURE — 11042 DBRDMT SUBQ TIS 1ST 20SQCM/<: CPT

## 2019-01-10 ENCOUNTER — HOSPITAL ENCOUNTER (OUTPATIENT)
Dept: WOUND CARE | Age: 64
Discharge: HOME | End: 2019-01-10
Attending: INTERNAL MEDICINE
Payer: MEDICARE

## 2019-01-10 VITALS — RESPIRATION RATE: 18 BRPM | HEART RATE: 76 BPM | DIASTOLIC BLOOD PRESSURE: 72 MMHG | SYSTOLIC BLOOD PRESSURE: 107 MMHG

## 2019-01-10 PROCEDURE — 99213 OFFICE O/P EST LOW 20 MIN: CPT | Performed by: INTERNAL MEDICINE

## 2019-01-10 PROCEDURE — 11042 DBRDMT SUBQ TIS 1ST 20SQCM/<: CPT

## 2019-01-10 PROCEDURE — 11042 DBRDMT SUBQ TIS 1ST 20SQCM/<: CPT | Performed by: INTERNAL MEDICINE

## 2019-01-10 RX ORDER — LIDOCAINE HYDROCHLORIDE 40 MG/ML
SOLUTION TOPICAL ONCE
Status: DISCONTINUED | OUTPATIENT
Start: 2019-01-10 | End: 2019-01-11 | Stop reason: HOSPADM

## 2019-01-17 ENCOUNTER — HOSPITAL ENCOUNTER (OUTPATIENT)
Dept: WOUND CARE | Age: 64
Discharge: HOME | End: 2019-01-17
Attending: INTERNAL MEDICINE
Payer: MEDICARE

## 2019-01-17 VITALS — HEART RATE: 78 BPM | RESPIRATION RATE: 18 BRPM | DIASTOLIC BLOOD PRESSURE: 57 MMHG | SYSTOLIC BLOOD PRESSURE: 92 MMHG

## 2019-01-17 PROCEDURE — 11042 DBRDMT SUBQ TIS 1ST 20SQCM/<: CPT | Performed by: INTERNAL MEDICINE

## 2019-01-17 PROCEDURE — 99213 OFFICE O/P EST LOW 20 MIN: CPT | Performed by: INTERNAL MEDICINE

## 2019-01-17 PROCEDURE — 11042 DBRDMT SUBQ TIS 1ST 20SQCM/<: CPT

## 2019-01-17 RX ORDER — LIDOCAINE HYDROCHLORIDE 40 MG/ML
SOLUTION TOPICAL ONCE
Status: DISCONTINUED | OUTPATIENT
Start: 2019-01-17 | End: 2019-01-18 | Stop reason: HOSPADM

## 2019-01-22 ENCOUNTER — HOSPITAL ENCOUNTER (OUTPATIENT)
Dept: WOUND CARE | Age: 64
Discharge: HOME | End: 2019-01-22
Payer: MEDICARE

## 2019-01-22 VITALS — HEART RATE: 86 BPM | SYSTOLIC BLOOD PRESSURE: 107 MMHG | RESPIRATION RATE: 18 BRPM | DIASTOLIC BLOOD PRESSURE: 64 MMHG

## 2019-01-22 PROCEDURE — 11042 DBRDMT SUBQ TIS 1ST 20SQCM/<: CPT

## 2019-01-22 RX ORDER — LIDOCAINE HYDROCHLORIDE 40 MG/ML
SOLUTION TOPICAL ONCE
Status: DISCONTINUED | OUTPATIENT
Start: 2019-01-22 | End: 2019-01-23 | Stop reason: HOSPADM

## 2019-04-11 NOTE — PLAN OF CARE
Discharge instructions given. Patient verbalized understanding. Return to [facility] in 3 weeks.

## 2019-04-11 NOTE — PROGRESS NOTES
[facility]  Progress Note and Procedure Note      Ruiz Childers  AGE: 59 y.o. GENDER: female  : 1955  TODAY'S DATE:  2019    Subjective:     Chief Complaint   Patient presents with    Wound Check     buttock F/U         HISTORY of PRESENT ILLNESS HPI     Ruiz Childers is a 59 y.o. female who presents today for wound evaluation. History of Wound: Patient is unable to give details due to cognitive delay. The manager of her group home is with her. She sits in her wheelchair which she states is several years old most of the day. She attends an adult  program. Pressure wound estimated to be present since the start of the New Year.      Wound Type:  pressure  Modifying Factors:  chronic pressure and decreased mobility  Associated Signs/Symptoms:  drainage    PAST MEDICAL HISTORY        Diagnosis Date    Cerebral palsy (HCC)     Constipation     Dysphagia     mechanical soft diet, nectar thick liquids    Fracture     left leg brace per sister 2014    Intractable epilepsy ([address]     Mental retardation     Osteoporosis     Recurrent UTI     Seizure disorder ([address]     seizures well controlled( last seizure )    Wheelchair bound        PAST SURGICAL HISTORY    Past Surgical History:   Procedure Laterality Date    CATARACT REMOVAL WITH IMPLANT  2/25/10    DENTAL SURGERY  9/01/10    dental rehabilitation    DENTAL SURGERY  12    dental rehab    DENTAL SURGERY  14    DENTAL REHABILITATION                        FAMILY HISTORY    History reviewed. No pertinent family history.     SOCIAL HISTORY    Social History     Tobacco Use    Smoking status: Never Smoker    Smokeless tobacco: Never Used   Substance Use Topics    Alcohol use: No    Drug use: No       ALLERGIES    Allergies   Allergen Reactions    Macrobid [Nitrofurantoin Monohydrate Macrocrystals]     Neosporin [Neomycin-Polymyxin-Gramicidin]     Nitrofurantoin     Nitrofurantoin Macrocrystal     Penicillins     Sulfa Antibiotics        MEDICATIONS    Current Outpatient Medications on File Prior to Encounter   Medication Sig Dispense Refill    Gauze Pads & Dressings (CURITY IODOFORM PACKING STRIP) MISC Loosely pack into wound 1 each 2    ferrous sulfate 325 (65 FE) MG tablet Take 325 mg by mouth daily (with breakfast)      acyclovir (ZOVIRAX) 5 % ointment Apply topically every 3 hours Apply topically every 3 hours.  dicyclomine (BENTYL) 20 MG tablet Take 20 mg by mouth 2 times daily.  omeprazole (PRILOSEC) 40 MG capsule Take 40 mg by mouth daily.  calcium carbonate (OSCAL) 500 MG TABS tablet Take 500 mg by mouth 2 times daily.  Potassium Chloride ER 20 MEQ TBCR Take 20 mEq by mouth daily.  guaiFENesin (MUCINEX) 600 MG SR tablet Take 1,200 mg by mouth every 12 hours as needed for Congestion.  citalopram (CELEXA) 10 MG tablet Take 20 mg by mouth daily       ibuprofen (IBU) 600 MG tablet Take 1 tablet by mouth every 8 hours as needed for Pain. 20 tablet 0    acetaminophen (TYLENOL) 325 MG tablet Take 650 mg by mouth every 6 hours as needed.  divalproex (DEPAKOTE SPRINKLE) 125 MG capsule Take 2 capsules by mouth 2 times daily. 60 capsule 1    NYSTATIN IN AQUAPHOR OINTMENT Apply  topically 2 times daily as needed.  Dextromethorphan-Guaifenesin (ROBITUSSIN DM PO) Take 10 mLs by mouth every 4 hours as needed.  diazepam (DIASTAT ACUDIAL) 10 MG GEL Place  rectally once as needed. UP TO 15 MG RECTALLY PRN, any seizure greater than 5 min       chlorhexidine (PERIDEX) 0.12 % solution Take 15 mLs by mouth 2 times daily.  docusate sodium (COLACE) 100 MG capsule Take 100 mg by mouth 2 times daily.  furosemide (LASIX) 40 MG tablet Take 40 mg by mouth daily.  polyethylene glycol (GLYCOLAX) powder Take 17 g by mouth 2 times daily.  MIX 1 TABLESPOON IN JUICE       No current facility-administered medications on file prior to encounter. REVIEW OF SYSTEMS    Pertinent items are noted in HPI. Objective:      Temp 97.1 °F (36.2 °C) (Axillary)   Resp 18     PHYSICAL EXAM    General Appearance: alert and oriented to person, place and time, well-developed and well-nourished, in no acute distress  Skin: warm and dry  Head: normocephalic and atraumatic  Eyes: extraocular eye movements intact and conjunctivae normal  Extremities: no cyanosis and no clubbing  Musculoskeletal: normal range of motion, no joint swelling, deformity or tenderness      Assessment:     Patient Active Problem List   Diagnosis    Dental disorder    Mental retardation    Cerebral palsy ([address]    Seizure disorder ([address]    Osteoporosis    Dysphagia    Status epilepticus, generalized convulsive ([address]    CH[facility]estive heart failure) [facility]    Decubitus ulcer of left heel, stage 2    Wound, open, abdominal wall, anterior    Pressure injury of left buttock, stage 3 ([address]    Open wound of left heel       Procedure Note    Performed by: Thalia Prasad DO    Consent obtained: Yes    Time out taken:  Yes    Pain Control: Anesthetic  Anesthetic: (4% Lidocaine Cream)     Debridement:Excisional Debridement    Using curette the wound was sharply debrided    down through and including the removal of subcutaneous tissue.         Devitalized Tissue Debrided:  fibrin, biofilm, slough and exudate    Pre Debridement Measurements:  Are located in the Wound Documentation Flow Sheet    Wound #: 5     Post  Debridement Measurements:  Wound 01/31/18 Heel Left #3 (Active)   Wound Image   11/14/2018 10:55 AM   Wound Type Wound 11/14/2018 10:55 AM   Wound Pressure Stage  3 11/14/2018 10:55 AM   Dressing Changed Changed/New 5/23/2018 11:27 AM   Dressing/Treatment Dry dressing;Foam;Other (Comment) 8/31/2018 11:22 AM   Wound Cleansed Rinsed/Irrigated with saline 11/14/2018 10:55 AM   Wound Length (cm) 0 cm 11/14/2018 10:55 AM   Wound Width (cm) 0 cm 11/14/2018 10:55 AM   Wound Depth (cm)  0 11/14/2018 10:55 AM   Calculated Wound Size (cm^2) (l*w) 0 cm^2 11/14/2018 10:55 AM   Change in Wound Size % (l*w) 100 11/14/2018 10:55 AM   Wound Assessment Epithelialization 11/14/2018 10:55 AM   Drainage Amount None 11/14/2018 10:55 AM   Drainage Description Serosanguinous 10/26/2018 11:11 AM   Odor None 11/14/2018 10:55 AM   Sharla-wound Assessment Dry 11/14/2018 10:55 AM   [place]%Wound Bed 50 11/14/2018 10:55 AM   Red%Wound Bed 0 11/14/2018 10:55 AM   Yellow%Wound Bed 0 11/14/2018 10:55 AM   Black%Wound Bed 50 11/14/2018 10:55 AM   Purple%Wound Bed 0 11/14/2018 10:55 AM   Other%Wound Bed 0 11/14/2018 10:55 AM   Time out Yes 10/26/2018 11:46 AM   Op First Treatment Date 07/19/18 7/19/2018 10:22 AM   Number of days: 343       Incision 03/05/14 Mouth Lower (Active)   Number of days: 7570       Wound 01/03/19 Buttocks Left #5 (Active)   Wound Image   1/3/2019  2:30 PM   Wound Pressure Stage  3 1/10/2019  9:18 AM   Wound Cleansed Rinsed/Irrigated with saline 1/10/2019  9:18 AM   Wound Length (cm) 1.8 cm 1/10/2019  9:18 AM   Wound Width (cm) 2.9 cm 1/10/2019  9:18 AM   Wound Depth (cm) 1.8 cm 1/10/2019  9:18 AM   Wound Surface Area (cm^2) 5.22 cm^2 1/10/2019  9:18 AM   Change in Wound Size % (l*w) 15.81 1/10/2019  9:18 AM   Wound Volume (cm^3) 9.4 cm^3 1/10/2019  9:18 AM   Wound Healing % 24 1/10/2019  9:18 AM   Post-Procedure Length (cm) 2 cm 1/3/2019  2:48 PM   Post-Procedure Width (cm) 3.1 cm 1/3/2019  2:48 PM   Post-Procedure Depth (cm) 2 cm 1/3/2019  2:48 PM   Post-Procedure Surface Area (cm^2) 6.2 cm^2 1/3/2019  2:48 PM   Post-Procedure Volume (cm^3) 12.4 cm^3 1/3/2019  2:48 PM   Undermining Starts ___ O'Clock 0900 1/10/2019  9:18 AM   Undermining Ends___ O'Clock 1200 1/10/2019  9:18 AM   Undermining Maxium Distance (cm) 2.5 1/10/2019  9:18 AM   Wound Assessment Pink;Slough; Yellow 1/10/2019  9:18 AM   Drainage Amount Moderate 1/10/2019  9:18 AM   Drainage Description Serosanguinous 1/10/2019  9:18 AM

## 2019-05-02 NOTE — PROGRESS NOTES
[facility]  Progress Note and Procedure Note      Remi Kendall  AGE: 59 y.o. GENDER: female  : 1955  TODAY'S DATE:  2019    Subjective:     Chief Complaint   Patient presents with    Wound Check     Right buttock         HISTORY of PRESENT ILLNESS HPI     Remi Kendall is a 59 y.o. female who presents today for wound evaluation. History of Wound: Patient is unable to give details due to cognitive delay. The manager of her group home is with her. She sits in her wheelchair which she states is several years old most of the day. She attends an adult  program. Pressure wound estimated to be present since the start of the New Year.      Wound Type:  pressure  Modifying Factors:  chronic pressure and decreased mobility  Associated Signs/Symptoms:  drainage    PAST MEDICAL HISTORY        Diagnosis Date    Cerebral palsy (HCC)     Constipation     Dysphagia     mechanical soft diet, nectar thick liquids    Fracture     left leg brace per sister 2014    Intractable epilepsy ([address]     Mental retardation     Osteoporosis     Recurrent UTI     Seizure disorder ([address]     seizures well controlled( last seizure )    Wheelchair bound        PAST SURGICAL HISTORY    Past Surgical History:   Procedure Laterality Date    CATARACT REMOVAL WITH IMPLANT  2/25/10    DENTAL SURGERY  9/01/10    dental rehabilitation    DENTAL SURGERY  12    dental rehab    DENTAL SURGERY  14    DENTAL REHABILITATION                        FAMILY HISTORY    History reviewed. No pertinent family history.     SOCIAL HISTORY    Social History     Tobacco Use    Smoking status: Never Smoker    Smokeless tobacco: Never Used   Substance Use Topics    Alcohol use: No    Drug use: No       ALLERGIES    Allergies   Allergen Reactions    Macrobid [Nitrofurantoin Monohydrate Macrocrystals]     Neosporin [Neomycin-Polymyxin-Gramicidin]     Nitrofurantoin     Nitrofurantoin Macrocrystal     Penicillins     Sulfa Antibiotics        MEDICATIONS    Current Outpatient Medications on File Prior to Encounter   Medication Sig Dispense Refill    betamethasone dipropionate (DIPROLENE) 0.05 % cream Apply topically daily with each dressing change. 45 g 0    ferrous sulfate 325 (65 FE) MG tablet Take 325 mg by mouth daily (with breakfast)      acyclovir (ZOVIRAX) 5 % ointment Apply topically every 3 hours Apply topically every 3 hours.  dicyclomine (BENTYL) 20 MG tablet Take 20 mg by mouth 2 times daily.  omeprazole (PRILOSEC) 40 MG capsule Take 40 mg by mouth daily.  calcium carbonate (OSCAL) 500 MG TABS tablet Take 500 mg by mouth 2 times daily.  Potassium Chloride ER 20 MEQ TBCR Take 20 mEq by mouth daily.  citalopram (CELEXA) 10 MG tablet Take 20 mg by mouth daily       divalproex (DEPAKOTE SPRINKLE) 125 MG capsule Take 2 capsules by mouth 2 times daily. 60 capsule 1    chlorhexidine (PERIDEX) 0.12 % solution Take 15 mLs by mouth 2 times daily.  docusate sodium (COLACE) 100 MG capsule Take 100 mg by mouth 2 times daily.  furosemide (LASIX) 40 MG tablet Take 40 mg by mouth daily.  polyethylene glycol (GLYCOLAX) powder Take 17 g by mouth 2 times daily. MIX 1 TABLESPOON IN JUICE      Gauze Pads & Dressings (CURITY IODOFORM PACKING STRIP) MISC Loosely pack into wound 1 each 2    guaiFENesin (MUCINEX) 600 MG SR tablet Take 1,200 mg by mouth every 12 hours as needed for Congestion.  ibuprofen (IBU) 600 MG tablet Take 1 tablet by mouth every 8 hours as needed for Pain. 20 tablet 0    acetaminophen (TYLENOL) 325 MG tablet Take 650 mg by mouth every 6 hours as needed.  NYSTATIN IN AQUAPHOR OINTMENT Apply  topically 2 times daily as needed.  Dextromethorphan-Guaifenesin (ROBITUSSIN DM PO) Take 10 mLs by mouth every 4 hours as needed.  diazepam (DIASTAT ACUDIAL) 10 MG GEL Place  rectally once as needed.  UP TO 15 MG RECTALLY PRN, any seizure greater than 5 min        No current facility-administered medications on file prior to encounter. REVIEW OF SYSTEMS    Pertinent items are noted in HPI. Objective: There were no vitals taken for this visit. PHYSICAL EXAM    General Appearance: alert and oriented to person, place and time, well-developed and well-nourished, in no acute distress  Skin: warm and dry  Head: normocephalic and atraumatic  Eyes: extraocular eye movements intact and conjunctivae normal  Extremities: no cyanosis and no clubbing  Musculoskeletal: normal range of motion, no joint swelling, deformity or tenderness      Assessment:     Patient Active Problem List   Diagnosis    Dental disorder    Mental retardation    Cerebral palsy ([address]    Seizure disorder ([address]    Osteoporosis    Dysphagia    Status epilepticus, generalized convulsive ([address]    CHF (congestive heart failure) (HCC)    Decubitus ulcer of left heel, stage 2    Wound, open, abdominal wall, anterior    Pressure injury of left buttock, stage 3 ([address]    Open wound of left heel       Procedure Note    Performed by: Farhad Hill DO    Consent obtained: Yes    Time out taken:  Yes    Pain Control:       Debridement:Excisional Debridement    Using curette the wound was sharply debrided    down through and including the removal of subcutaneous tissue.         Devitalized Tissue Debrided:  fibrin, biofilm, slough and exudate    Pre Debridement Measurements:  Are located in the Wound Documentation Flow Sheet    Wound #: 5     Post  Debridement Measurements:  Wound 01/31/18 Heel Left #3 (Active)   Wound Image   11/14/2018 10:55 AM   Wound Type Wound 11/14/2018 10:55 AM   Wound Pressure Stage  3 11/14/2018 10:55 AM   Dressing Changed Changed/New 5/23/2018 11:27 AM   Dressing/Treatment Dry dressing;Foam;Other (Comment) 8/31/2018 11:22 AM   Wound Cleansed Rinsed/Irrigated with saline 11/14/2018 10:55 AM   Wound Length (cm) 0 cm 11/14/2018 10:55 AM   Wound Width (cm) 0 cm 11/14/2018 10:55 AM   Wound Depth (cm)  0 11/14/2018 10:55 AM   Calculated Wound Size (cm^2) (l*w) 0 cm^2 11/14/2018 10:55 AM   Change in Wound Size % (l*w) 100 11/14/2018 10:55 AM   Wound Assessment Epithelialization 11/14/2018 10:55 AM   Drainage Amount None 11/14/2018 10:55 AM   Drainage Description Serosanguinous 10/26/2018 11:11 AM   Odor None 11/14/2018 10:55 AM   Sharla-wound Assessment Dry 11/14/2018 10:55 AM   [place]%Wound Bed 50 11/14/2018 10:55 AM   Red%Wound Bed 0 11/14/2018 10:55 AM   Yellow%Wound Bed 0 11/14/2018 10:55 AM   Black%Wound Bed 50 11/14/2018 10:55 AM   Purple%Wound Bed 0 11/14/2018 10:55 AM   Other%Wound Bed 0 11/14/2018 10:55 AM   Time out Yes 10/26/2018 11:46 AM   Op First Treatment Date 07/19/18 7/19/2018 10:22 AM   Number of days: 343       Incision 03/05/14 Mouth Lower (Active)   Number of days: 6761       Wound 01/03/19 Buttocks Left #5 (Active)   Wound Image   1/3/2019  2:30 PM   Wound Pressure Stage  3 1/10/2019  9:18 AM   Wound Cleansed Rinsed/Irrigated with saline 1/10/2019  9:18 AM   Wound Length (cm) 1.8 cm 1/10/2019  9:18 AM   Wound Width (cm) 2.9 cm 1/10/2019  9:18 AM   Wound Depth (cm) 1.8 cm 1/10/2019  9:18 AM   Wound Surface Area (cm^2) 5.22 cm^2 1/10/2019  9:18 AM   Change in Wound Size % (l*w) 15.81 1/10/2019  9:18 AM   Wound Volume (cm^3) 9.4 cm^3 1/10/2019  9:18 AM   Wound Healing % 24 1/10/2019  9:18 AM   Post-Procedure Length (cm) 2 cm 1/3/2019  2:48 PM   Post-Procedure Width (cm) 3.1 cm 1/3/2019  2:48 PM   Post-Procedure Depth (cm) 2 cm 1/3/2019  2:48 PM   Post-Procedure Surface Area (cm^2) 6.2 cm^2 1/3/2019  2:48 PM   Post-Procedure Volume (cm^3) 12.4 cm^3 1/3/2019  2:48 PM   Undermining Starts ___ O'Clock 0900 1/10/2019  9:18 AM   Undermining Ends___ O'Clock 1200 1/10/2019  9:18 AM   Undermining Maxium Distance (cm) 2.5 1/10/2019  9:18 AM   Wound Assessment Pink;Slough; Yellow 1/10/2019  9:18 AM   Drainage Amount Moderate 1/10/2019  9:18 AM   Drainage Description Serosanguinous 1/10/2019  9:18 AM   Rory-wound Assessment Dry 1/10/2019  9:18 AM   [place]%Wound Bed 80 1/10/2019  9:18 AM   Red%Wound Bed 0 1/10/2019  9:18 AM   Yellow%Wound Bed 20 1/10/2019  9:18 AM   Black%Wound Bed 0 1/10/2019  9:18 AM   Purple%Wound Bed 0 1/10/2019  9:18 AM   Other%Wound Bed 0 1/3/2019  2:30 PM   Number of days: 6       Total Surface Area Debrided:  0.7 sq cm     Percentage of wound debrided 100%    Bleeding:  Minimal    Hemostasis Achieved:  by pressure    Procedural Pain:  3  / 10     Post Procedural Pain:  0 / 10     Response to treatment:  Well tolerated by patient. Plan:     The nature of the patient's condition was explained in depth. The patient was informed that their compliance to the treatment plan is paramount to successful healing and prevention of further ulceration and/or infection     Discharge Treatment      Written Patient Discharge Instructions Given    Wound: Left buttock     With each dressing change, rinse wounds with 0.9% Saline. (May use wound wash or soft contact solution. Both can be purchased at a local drug store). If unable to obtain saline, may use a gentle soap and water.     Dressing care: Betamethasone cream to rory wound with each dressing change. Loosely pack with Iodoform (use only one piece of Iodoform when packing), dry dressing (size to incorporate the triad to rory wound)- Change every other day or daily if drains through. Make sure pack up at angle at 11:00. Patient needs new pressure relieving mattress. Needs new cushion for chair or possibly new chair. Keep patient off buttock/ wound as much as possible. Can go to Day Program 1 day a week- when sitting place pillow under hip to relieve pressure & reposition frequently. Thank you for allowing me to participate in the care of your patient. Please do not hesitate to call.      Debra Lewis D.O., [facility] [place]  Interventional Cardiology     o:

## 2019-05-30 NOTE — PROGRESS NOTES
[facility]  Progress Note and Procedure Note      Rebecca Lee  AGE: 59 y.o. GENDER: female  : 1955  TODAY'S DATE:  2019    Subjective:     CC: wound     HISTORY of PRESENT ILLNESS HPI     Rebecca Lee is a 59 y.o. female who presents today for wound evaluation. History of Wound: Patient is unable to give details due to cognitive delay. The manager of her group home is with her. She sits in her wheelchair which she states is several years old most of the day. She attends an adult  program. Pressure wound estimated to be present since the start of the New Year.      Wound Type:  pressure  Modifying Factors:  chronic pressure and decreased mobility  Associated Signs/Symptoms:  drainage    PAST MEDICAL HISTORY        Diagnosis Date    Cerebral palsy (HCC)     Constipation     Dysphagia     mechanical soft diet, nectar thick liquids    Fracture     left leg brace per sister 2014    Intractable epilepsy ([address]     Mental retardation     Osteoporosis     Recurrent UTI     Seizure disorder ([address]     seizures well controlled( last seizure )    Wheelchair bound        PAST SURGICAL HISTORY    Past Surgical History:   Procedure Laterality Date    CATARACT REMOVAL WITH IMPLANT  2/25/10    DENTAL SURGERY  9/01/10    dental rehabilitation    DENTAL SURGERY  12    dental rehab    DENTAL SURGERY  14    DENTAL REHABILITATION                        FAMILY HISTORY    History reviewed. No pertinent family history.     SOCIAL HISTORY    Social History     Tobacco Use    Smoking status: Never Smoker    Smokeless tobacco: Never Used   Substance Use Topics    Alcohol use: No    Drug use: No       ALLERGIES    Allergies   Allergen Reactions    Macrobid [Nitrofurantoin Monohydrate Macrocrystals]     Neosporin [Neomycin-Polymyxin-Gramicidin]     Nitrofurantoin     Nitrofurantoin Macrocrystal     Penicillins     Sulfa Antibiotics MEDICATIONS    Current Outpatient Medications on File Prior to Encounter   Medication Sig Dispense Refill    betamethasone dipropionate (DIPROLENE) 0.05 % cream Apply topically daily with each dressing change. 45 g 0    Gauze Pads & Dressings (CURITY IODOFORM PACKING STRIP) MISC Loosely pack into wound 1 each 2    ferrous sulfate 325 (65 FE) MG tablet Take 325 mg by mouth daily (with breakfast)      acyclovir (ZOVIRAX) 5 % ointment Apply topically every 3 hours Apply topically every 3 hours.  dicyclomine (BENTYL) 20 MG tablet Take 20 mg by mouth 2 times daily.  omeprazole (PRILOSEC) 40 MG capsule Take 40 mg by mouth daily.  calcium carbonate (OSCAL) 500 MG TABS tablet Take 500 mg by mouth 2 times daily.  Potassium Chloride ER 20 MEQ TBCR Take 20 mEq by mouth daily.  guaiFENesin (MUCINEX) 600 MG SR tablet Take 1,200 mg by mouth every 12 hours as needed for Congestion.  citalopram (CELEXA) 10 MG tablet Take 20 mg by mouth daily       ibuprofen (IBU) 600 MG tablet Take 1 tablet by mouth every 8 hours as needed for Pain. 20 tablet 0    acetaminophen (TYLENOL) 325 MG tablet Take 650 mg by mouth every 6 hours as needed.  divalproex (DEPAKOTE SPRINKLE) 125 MG capsule Take 2 capsules by mouth 2 times daily. 60 capsule 1    NYSTATIN IN AQUAPHOR OINTMENT Apply  topically 2 times daily as needed.  Dextromethorphan-Guaifenesin (ROBITUSSIN DM PO) Take 10 mLs by mouth every 4 hours as needed.  diazepam (DIASTAT ACUDIAL) 10 MG GEL Place  rectally once as needed. UP TO 15 MG RECTALLY PRN, any seizure greater than 5 min       chlorhexidine (PERIDEX) 0.12 % solution Take 15 mLs by mouth 2 times daily.  docusate sodium (COLACE) 100 MG capsule Take 100 mg by mouth 2 times daily.  furosemide (LASIX) 40 MG tablet Take 40 mg by mouth daily.  polyethylene glycol (GLYCOLAX) powder Take 17 g by mouth 2 times daily.  MIX 1 TABLESPOON IN JUICE       No current facility-administered medications on file prior to encounter. REVIEW OF SYSTEMS    Pertinent items are noted in HPI. Objective: There were no vitals taken for this visit. PHYSICAL EXAM    General Appearance: alert and oriented to person, place and time, well-developed and well-nourished, in no acute distress  Skin: warm and dry  Head: normocephalic and atraumatic  Eyes: extraocular eye movements intact and conjunctivae normal  Extremities: no cyanosis and no clubbing  Musculoskeletal: normal range of motion, no joint swelling, deformity or tenderness      Assessment:     Patient Active Problem List   Diagnosis    Dental disorder    Mental retardation    Cerebral palsy ([address]    Seizure disorder ([address]    Osteoporosis    Dysphagia    Status epilepticus, generalized convulsive ([address]    CHF (congestive heart failure) (HCC)    Decubitus ulcer of left heel, stage 2    Wound, open, abdominal wall, anterior    Pressure injury of left buttock, stage 3 ([address]    Open wound of left heel       Procedure Note    Performed by: Lynsey November, DO    Consent obtained: Yes    Time out taken:  Yes    Pain Control: Anesthetic  Anesthetic: Other (comment)(4% cream)     Debridement:Excisional Debridement    Using curette the wound was sharply debrided    down through and including the removal of subcutaneous tissue.         Devitalized Tissue Debrided:  fibrin, biofilm, slough and exudate    Pre Debridement Measurements:  Are located in the Wound Documentation Flow Sheet    Wound #: 5     Post  Debridement Measurements:  Wound 01/31/18 Heel Left #3 (Active)   Wound Image   11/14/2018 10:55 AM   Wound Type Wound 11/14/2018 10:55 AM   Wound Pressure Stage  3 11/14/2018 10:55 AM   Dressing Changed Changed/New 5/23/2018 11:27 AM   Dressing/Treatment Dry dressing;Foam;Other (Comment) 8/31/2018 11:22 AM   Wound Cleansed Rinsed/Irrigated with saline 11/14/2018 10:55 AM   Wound Length (cm) 0 cm 11/14/2018 10:55 Drainage Description Serosanguinous 1/10/2019  9:18 AM   Sharla-wound Assessment Dry 1/10/2019  9:18 AM   [place]%Wound Bed 80 1/10/2019  9:18 AM   Red%Wound Bed 0 1/10/2019  9:18 AM   Yellow%Wound Bed 20 1/10/2019  9:18 AM   Black%Wound Bed 0 1/10/2019  9:18 AM   Purple%Wound Bed 0 1/10/2019  9:18 AM   Other%Wound Bed 0 1/3/2019  2:30 PM   Number of days: 6       Total Surface Area Debrided:  0.05 sq cm     Percentage of wound debrided 100%    Bleeding:  Minimal    Hemostasis Achieved:  by pressure    Procedural Pain:  3  / 10     Post Procedural Pain:  0 / 10     Response to treatment:  Well tolerated by patient. Plan:     The nature of the patient's condition was explained in depth. The patient was informed that their compliance to the treatment plan is paramount to successful healing and prevention of further ulceration and/or infection     Discharge Treatment      Written Patient Discharge Instructions Given    Wound: Left buttock     With each dressing change, rinse wounds with 0.9% Saline. (May use wound wash or soft contact solution. Both can be purchased at a local drug store). If unable to obtain saline, may use a gentle soap and water.     Dressing care: Gentamicin cream, dry dressing- Change daily if drains through. Needs new cushion for chair or possibly new chair. Keep patient off buttock/wound as much as possible. Can go to Day Program on Monday's, Wednesday's, & Friday's- when sitting place pillow under hip to relieve pressure & reposition frequently. Continue to use the offloading mattress      Thank you for allowing me to participate in the care of your patient. Please do not hesitate to call.      Jacquelyn Dakins, D.O., [facility] [place]  Interventional Cardiology     o: [phone]  [facility] [address], [address] [facility]

## 2019-05-30 NOTE — PLAN OF CARE
Discharge instructions given. Patient verbalized understanding. Return to [facility] in 2 weeks.

## 2019-06-20 NOTE — PROGRESS NOTES
[facility]  Progress Note      Amalia Samuels  AGE: 59 y.o. GENDER: female  : 1955  TODAY'S DATE:  2019    Subjective:     CC: wound     HISTORY of PRESENT ILLNESS HPI     Amalia Samuels is a 59 y.o. female who presents today for wound evaluation. History of Wound: Patient is unable to give details due to cognitive delay. The manager of her group home is with her. She sits in her wheelchair which she states is several years old most of the day. She attends an adult  program. Pressure wound estimated to be present since the start of the New Year.      Wound Type:  pressure  Modifying Factors:  chronic pressure and decreased mobility  Associated Signs/Symptoms:  drainage    PAST MEDICAL HISTORY        Diagnosis Date    Cerebral palsy (HCC)     Constipation     Dysphagia     mechanical soft diet, nectar thick liquids    Fracture     left leg brace per sister 2014    Intractable epilepsy ([address]     Mental retardation     Osteoporosis     Recurrent UTI     Seizure disorder ([address]     seizures well controlled( last seizure )    Wheelchair bound        PAST SURGICAL HISTORY    Past Surgical History:   Procedure Laterality Date    CATARACT REMOVAL WITH IMPLANT  2/25/10    DENTAL SURGERY  9/01/10    dental rehabilitation    DENTAL SURGERY  12    dental rehab    DENTAL SURGERY  14    DENTAL REHABILITATION                        FAMILY HISTORY    History reviewed. No pertinent family history.     SOCIAL HISTORY    Social History     Tobacco Use    Smoking status: Never Smoker    Smokeless tobacco: Never Used   Substance Use Topics    Alcohol use: No    Drug use: No       ALLERGIES    Allergies   Allergen Reactions    Macrobid [Nitrofurantoin Monohydrate Macrocrystals]     Neosporin [Neomycin-Polymyxin-Gramicidin]     Nitrofurantoin     Nitrofurantoin Macrocrystal     Penicillins     Sulfa Antibiotics        MEDICATIONS    Current Outpatient Medications on File Prior to Encounter   Medication Sig Dispense Refill    gentamicin (GARAMYCIN) 0.1 % cream Apply topically daily. 1 Tube 0    betamethasone dipropionate (DIPROLENE) 0.05 % cream Apply topically daily with each dressing change. 45 g 0    Gauze Pads & Dressings (CURITY IODOFORM PACKING STRIP) MISC Loosely pack into wound 1 each 2    ferrous sulfate 325 (65 FE) MG tablet Take 325 mg by mouth daily (with breakfast)      acyclovir (ZOVIRAX) 5 % ointment Apply topically every 3 hours Apply topically every 3 hours.  dicyclomine (BENTYL) 20 MG tablet Take 20 mg by mouth 2 times daily.  omeprazole (PRILOSEC) 40 MG capsule Take 40 mg by mouth daily.  calcium carbonate (OSCAL) 500 MG TABS tablet Take 500 mg by mouth 2 times daily.  Potassium Chloride ER 20 MEQ TBCR Take 20 mEq by mouth daily.  guaiFENesin (MUCINEX) 600 MG SR tablet Take 1,200 mg by mouth every 12 hours as needed for Congestion.  citalopram (CELEXA) 10 MG tablet Take 20 mg by mouth daily       ibuprofen (IBU) 600 MG tablet Take 1 tablet by mouth every 8 hours as needed for Pain. 20 tablet 0    acetaminophen (TYLENOL) 325 MG tablet Take 650 mg by mouth every 6 hours as needed.  divalproex (DEPAKOTE SPRINKLE) 125 MG capsule Take 2 capsules by mouth 2 times daily. 60 capsule 1    NYSTATIN IN AQUAPHOR OINTMENT Apply  topically 2 times daily as needed.  Dextromethorphan-Guaifenesin (ROBITUSSIN DM PO) Take 10 mLs by mouth every 4 hours as needed.  diazepam (DIASTAT ACUDIAL) 10 MG GEL Place  rectally once as needed. UP TO 15 MG RECTALLY PRN, any seizure greater than 5 min       chlorhexidine (PERIDEX) 0.12 % solution Take 15 mLs by mouth 2 times daily.  docusate sodium (COLACE) 100 MG capsule Take 100 mg by mouth 2 times daily.  furosemide (LASIX) 40 MG tablet Take 40 mg by mouth daily.  polyethylene glycol (GLYCOLAX) powder Take 17 g by mouth 2 times daily.  MIX 1 TABLESPOON IN JUICE       No current facility-administered medications on file prior to encounter. REVIEW OF SYSTEMS    Pertinent items are noted in HPI. Objective:      /70   Pulse 84   Resp 18     PHYSICAL EXAM    General Appearance: alert and oriented to person, place and time, well-developed and well-nourished, in no acute distress  Skin: warm and dry  Head: normocephalic and atraumatic  Eyes: extraocular eye movements intact and conjunctivae normal  Extremities: no cyanosis and no clubbing  Musculoskeletal: normal range of motion, no joint swelling, deformity or tenderness      Assessment:     Left buttock pressure injury   Measurements:  Are located in the Wound Documentation Flow Sheet    Wound #: 5     Plan:     The nature of the patient's condition was explained in depth. The patient was informed that their compliance to the treatment plan is paramount to successful healing and prevention of further ulceration and/or infection     Discharge Treatment      Written Patient Discharge Instructions Given    Wound: Left buttock- healed at today's visit. Thank you for allowing me to participate in the care of your patient. Please do not hesitate to call.      Sheela Jacobsen D.O., [facility] [place]  Interventional Cardiology     o: [phone]  [facility] [address], [address] [facility]

## 2019-08-09 PROBLEM — T14.8XXA OPEN WOUND: Status: ACTIVE | Noted: 2019-01-01

## 2019-08-09 NOTE — PROGRESS NOTES
Post  Debridement Measurements:  Wound 01/31/18 Heel Left #3 (Active)   Wound Image   11/14/2018 10:55 AM   Wound Type Wound 11/14/2018 10:55 AM   Wound Pressure Stage  3 11/14/2018 10:55 AM   Dressing Changed Changed/New 5/23/2018 11:27 AM   Dressing/Treatment Dry dressing;Foam;Other (Comment) 8/31/2018 11:22 AM   Wound Cleansed Rinsed/Irrigated with saline 11/14/2018 10:55 AM   Wound Length (cm) 0 cm 11/14/2018 10:55 AM   Wound Width (cm) 0 cm 11/14/2018 10:55 AM   Wound Depth (cm)  0 11/14/2018 10:55 AM   Calculated Wound Size (cm^2) (l*w) 0 cm^2 11/14/2018 10:55 AM   Change in Wound Size % (l*w) 100 11/14/2018 10:55 AM   Wound Assessment Epithelialization 11/14/2018 10:55 AM   Drainage Amount None 11/14/2018 10:55 AM   Drainage Description Serosanguinous 10/26/2018 11:11 AM   Odor None 11/14/2018 10:55 AM   Sharla-wound Assessment Dry 11/14/2018 10:55 AM   [place]%Wound Bed 50 11/14/2018 10:55 AM   Red%Wound Bed 0 11/14/2018 10:55 AM   Yellow%Wound Bed 0 11/14/2018 10:55 AM   Black%Wound Bed 50 11/14/2018 10:55 AM   Purple%Wound Bed 0 11/14/2018 10:55 AM   Other%Wound Bed 0 11/14/2018 10:55 AM   Time out Yes 10/26/2018 11:46 AM   Op First Treatment Date 07/19/18 7/19/2018 10:22 AM   Number of days: 554       Incision 03/05/14 Mouth Lower (Active)   Number of days: 1982       Wound 08/08/19 Sacrum Mid #2 (Active)   Wound Image   8/8/2019  1:07 PM   Wound Pressure Stage  3 8/8/2019  1:07 PM   Wound Cleansed Rinsed/Irrigated with saline 8/8/2019  1:07 PM   Wound Length (cm) 0.5 cm 8/8/2019  1:07 PM   Wound Width (cm) 0.2 cm 8/8/2019  1:07 PM   Wound Depth (cm) 0.2 cm 8/8/2019  1:07 PM   Wound Surface Area (cm^2) 0.1 cm^2 8/8/2019  1:07 PM   Wound Volume (cm^3) 0.02 cm^3 8/8/2019  1:07 PM   Post-Procedure Length (cm) 0.5 cm 8/8/2019  1:18 PM   Post-Procedure Width (cm) 0.2 cm 8/8/2019  1:18 PM   Post-Procedure Depth (cm) 0.2 cm 8/8/2019  1:18 PM   Post-Procedure Surface Area (cm^2) 0.1 cm^2 8/8/2019  1:18 PM

## 2019-08-22 NOTE — PLAN OF CARE
Discharge instructions given. Patient verbalized understanding. Return to [facility] in 1 week.   Called/faxed orders to  [facility]

## 2019-09-24 NOTE — PROGRESS NOTES
reviewed. No pertinent family history. SOCIAL HISTORY    Social History     Tobacco Use    Smoking status: Never Smoker    Smokeless tobacco: Never Used   Substance Use Topics    Alcohol use: No    Drug use: No       ALLERGIES    Allergies   Allergen Reactions    Macrobid [Nitrofurantoin Monohydrate Macrocrystals]     Neosporin [Neomycin-Polymyxin-Gramicidin]     Nitrofurantoin     Nitrofurantoin Macrocrystal     Penicillins     Sulfa Antibiotics        MEDICATIONS    Current Outpatient Medications on File Prior to Encounter   Medication Sig Dispense Refill    gentamicin (GARAMYCIN) 0.1 % cream Apply topically daily. 1 Tube 0    Gauze Pads & Dressings (CURITY IODOFORM PACKING STRIP) MISC Loosely pack into wound 1 each 2    ferrous sulfate 325 (65 FE) MG tablet Take 325 mg by mouth daily (with breakfast)      acyclovir (ZOVIRAX) 5 % ointment Apply topically every 3 hours Apply topically every 3 hours.  dicyclomine (BENTYL) 20 MG tablet Take 20 mg by mouth 2 times daily.  omeprazole (PRILOSEC) 40 MG capsule Take 40 mg by mouth daily.  calcium carbonate (OSCAL) 500 MG TABS tablet Take 500 mg by mouth 2 times daily.  Potassium Chloride ER 20 MEQ TBCR Take 20 mEq by mouth daily.  guaiFENesin (MUCINEX) 600 MG SR tablet Take 1,200 mg by mouth every 12 hours as needed for Congestion.  citalopram (CELEXA) 10 MG tablet Take 20 mg by mouth daily       ibuprofen (IBU) 600 MG tablet Take 1 tablet by mouth every 8 hours as needed for Pain. 20 tablet 0    acetaminophen (TYLENOL) 325 MG tablet Take 650 mg by mouth every 6 hours as needed.  divalproex (DEPAKOTE SPRINKLE) 125 MG capsule Take 2 capsules by mouth 2 times daily. 60 capsule 1    NYSTATIN IN AQUAPHOR OINTMENT Apply  topically 2 times daily as needed.  Dextromethorphan-Guaifenesin (ROBITUSSIN DM PO) Take 10 mLs by mouth every 4 hours as needed.       diazepam (DIASTAT ACUDIAL) 10 MG GEL Place  rectally once as needed. UP TO 15 MG RECTALLY PRN, any seizure greater than 5 min       chlorhexidine (PERIDEX) 0.12 % solution Take 15 mLs by mouth 2 times daily.  docusate sodium (COLACE) 100 MG capsule Take 100 mg by mouth 2 times daily.  furosemide (LASIX) 40 MG tablet Take 40 mg by mouth daily.  polyethylene glycol (GLYCOLAX) powder Take 17 g by mouth Indications: 3 times a week MIX 1 TABLESPOON IN JUICE       No current facility-administered medications on file prior to encounter. REVIEW OF SYSTEMS    Pertinent items are noted in HPI. Objective: There were no vitals taken for this visit. PHYSICAL EXAM    General Appearance: alert and oriented to person, place and time and with anxious affect  Skin: warm and dry, no rash or erythema  Head: normocephalic and atraumatic  Eyes: pupils equal, round, and reactive to light  Pulmonary/Chest:  normal air movement, no respiratory distress  Cardiovascular: normal rate and regular rhythm  Wound:  Decrease in size, no overt signs of infection. Assessment:     Patient Active Problem List   Diagnosis    Dental disorder    Mental retardation    Cerebral palsy ([address]    Seizure disorder ([address]    Osteoporosis    Dysphagia    Status epilepticus, generalized convulsive ([address]    CHF (congestive heart failure) (HCC)    Decubitus ulcer of left heel, stage 2    Wound, open, abdominal wall, anterior    Pressure injury of left buttock, stage 3 ([address]    Open wound of left heel    Open wound       Procedure Note    Performed by: RADHIKA Tilley CNP    Consent obtained: Yes    Time out taken:  Yes    Pain Control: Anesthetic  Anesthetic: Other (comment)(4% lidocaine cream)     Debridement:Excisional Debridement    Using curette the wound was sharply debrided    down through and including the removal of epidermis, dermis and subcutaneous tissue.         Devitalized Tissue Debrided:  fibrin, biofilm and slough    Pre Debridement

## 2020-01-01 ENCOUNTER — HOSPITAL ENCOUNTER (EMERGENCY)
Age: 65
End: 2020-01-24
Attending: EMERGENCY MEDICINE
Payer: MEDICARE

## 2020-01-01 ENCOUNTER — APPOINTMENT (OUTPATIENT)
Dept: GENERAL RADIOLOGY | Age: 65
End: 2020-01-01
Payer: MEDICARE

## 2020-01-01 ENCOUNTER — APPOINTMENT (OUTPATIENT)
Dept: CT IMAGING | Age: 65
End: 2020-01-01
Payer: MEDICARE

## 2020-01-01 VITALS
SYSTOLIC BLOOD PRESSURE: 113 MMHG | WEIGHT: 126 LBS | HEART RATE: 129 BPM | RESPIRATION RATE: 35 BRPM | BODY MASS INDEX: 28.34 KG/M2 | HEIGHT: 56 IN | TEMPERATURE: 95.6 F | DIASTOLIC BLOOD PRESSURE: 69 MMHG | OXYGEN SATURATION: 90 %

## 2020-01-01 LAB
A/G RATIO: 1 (ref 1.1–2.2)
ABO/RH: NORMAL
ALBUMIN SERPL-MCNC: 4 G/DL (ref 3.4–5)
ALP BLD-CCNC: 103 U/L (ref 40–129)
ALT SERPL-CCNC: 17 U/L (ref 10–40)
ANION GAP SERPL CALCULATED.3IONS-SCNC: 22 MMOL/L (ref 3–16)
ANTIBODY SCREEN: NORMAL
AST SERPL-CCNC: 18 U/L (ref 15–37)
BASOPHILS ABSOLUTE: 0 K/UL (ref 0–0.2)
BASOPHILS RELATIVE PERCENT: 0.3 %
BILIRUB SERPL-MCNC: <0.2 MG/DL (ref 0–1)
BUN BLDV-MCNC: 38 MG/DL (ref 7–20)
CALCIUM SERPL-MCNC: 9.6 MG/DL (ref 8.3–10.6)
CHLORIDE BLD-SCNC: 101 MMOL/L (ref 99–110)
CO2: 14 MMOL/L (ref 21–32)
CREAT SERPL-MCNC: 0.8 MG/DL (ref 0.6–1.2)
EOSINOPHILS ABSOLUTE: 0 K/UL (ref 0–0.6)
EOSINOPHILS RELATIVE PERCENT: 0.2 %
GFR AFRICAN AMERICAN: >60
GFR NON-AFRICAN AMERICAN: >60
GLOBULIN: 3.9 G/DL
GLUCOSE BLD-MCNC: 291 MG/DL (ref 70–99)
HCT VFR BLD CALC: 45.5 % (ref 36–48)
HEMOGLOBIN: 13.3 G/DL (ref 12–16)
INR BLD: 0.91 (ref 0.86–1.14)
LYMPHOCYTES ABSOLUTE: 3.7 K/UL (ref 1–5.1)
LYMPHOCYTES RELATIVE PERCENT: 25.8 %
MCH RBC QN AUTO: 25.1 PG (ref 26–34)
MCHC RBC AUTO-ENTMCNC: 29.2 G/DL (ref 31–36)
MCV RBC AUTO: 85.9 FL (ref 80–100)
MONOCYTES ABSOLUTE: 0.8 K/UL (ref 0–1.3)
MONOCYTES RELATIVE PERCENT: 5.3 %
NEUTROPHILS ABSOLUTE: 9.8 K/UL (ref 1.7–7.7)
NEUTROPHILS RELATIVE PERCENT: 68.4 %
PDW BLD-RTO: 19.8 % (ref 12.4–15.4)
PLATELET # BLD: 491 K/UL (ref 135–450)
PMV BLD AUTO: 8.7 FL (ref 5–10.5)
POTASSIUM REFLEX MAGNESIUM: 4.5 MMOL/L (ref 3.5–5.1)
PROTHROMBIN TIME: 10.6 SEC (ref 10–13.2)
RBC # BLD: 5.29 M/UL (ref 4–5.2)
SODIUM BLD-SCNC: 137 MMOL/L (ref 136–145)
TOTAL PROTEIN: 7.9 G/DL (ref 6.4–8.2)
TROPONIN: 0.03 NG/ML
WBC # BLD: 14.3 K/UL (ref 4–11)

## 2020-01-01 PROCEDURE — 84484 ASSAY OF TROPONIN QUANT: CPT

## 2020-01-01 PROCEDURE — 86850 RBC ANTIBODY SCREEN: CPT

## 2020-01-01 PROCEDURE — 6360000002 HC RX W HCPCS

## 2020-01-01 PROCEDURE — 74176 CT ABD & PELVIS W/O CONTRAST: CPT

## 2020-01-01 PROCEDURE — 85025 COMPLETE CBC W/AUTO DIFF WBC: CPT

## 2020-01-01 PROCEDURE — 86900 BLOOD TYPING SEROLOGIC ABO: CPT

## 2020-01-01 PROCEDURE — 36415 COLL VENOUS BLD VENIPUNCTURE: CPT

## 2020-01-01 PROCEDURE — 86901 BLOOD TYPING SEROLOGIC RH(D): CPT

## 2020-01-01 PROCEDURE — 96374 THER/PROPH/DIAG INJ IV PUSH: CPT

## 2020-01-01 PROCEDURE — 80053 COMPREHEN METABOLIC PANEL: CPT

## 2020-01-01 PROCEDURE — 85610 PROTHROMBIN TIME: CPT

## 2020-01-01 PROCEDURE — 99285 EMERGENCY DEPT VISIT HI MDM: CPT

## 2020-01-01 RX ORDER — 0.9 % SODIUM CHLORIDE 0.9 %
1000 INTRAVENOUS SOLUTION INTRAVENOUS ONCE
Status: DISCONTINUED | OUTPATIENT
Start: 2020-01-01 | End: 2020-01-01 | Stop reason: HOSPADM

## 2020-01-01 RX ORDER — PANTOPRAZOLE SODIUM 40 MG/10ML
80 INJECTION, POWDER, LYOPHILIZED, FOR SOLUTION INTRAVENOUS ONCE
Status: DISCONTINUED | OUTPATIENT
Start: 2020-01-01 | End: 2020-01-01 | Stop reason: HOSPADM

## 2020-01-01 RX ORDER — HYDROMORPHONE HYDROCHLORIDE 1 MG/ML
1 INJECTION, SOLUTION INTRAMUSCULAR; INTRAVENOUS; SUBCUTANEOUS ONCE
Status: DISCONTINUED | OUTPATIENT
Start: 2020-01-01 | End: 2020-01-01 | Stop reason: HOSPADM

## 2020-01-01 RX ORDER — ONDANSETRON 2 MG/ML
INJECTION INTRAMUSCULAR; INTRAVENOUS
Status: COMPLETED
Start: 2020-01-01 | End: 2020-01-01

## 2020-01-01 RX ORDER — LOPERAMIDE HYDROCHLORIDE 2 MG/1
2 CAPSULE ORAL 4 TIMES DAILY PRN
COMMUNITY

## 2020-01-01 RX ORDER — ONDANSETRON 2 MG/ML
4 INJECTION INTRAMUSCULAR; INTRAVENOUS ONCE
Status: COMPLETED | OUTPATIENT
Start: 2020-01-01 | End: 2020-01-01

## 2020-01-01 RX ORDER — LAMOTRIGINE 100 MG/1
100 TABLET ORAL DAILY
COMMUNITY

## 2020-01-01 RX ADMIN — ONDANSETRON 4 MG: 2 INJECTION INTRAMUSCULAR; INTRAVENOUS at 18:10

## 2020-01-24 NOTE — ED PROVIDER NOTES
eMERGENCY dEPARTMENT eNCOUnter      PtName: Shaw Hammond  MRN: [id number]  [place] 1955  Date of evaluation: 1/24/2020  Provider: [address]       Chief Complaint   Patient presents with    Emesis     brought in by squad from group home. black emesis and stool for the last hour. HISTORY OF PRESENT ILLNESS   (Location/Symptom, Timing/Onset,Context/Setting, Quality, Duration, Modifying Factors, Severity) Note limiting factors. HPI    Shaw Hammond is a 62 y.o. female who presents to the emergency department with chief complaint of black emesis, abdominal pain that started today. She is developmentally delayed and has a history of cerebral palsy. History is obtained from her sister-Polly. She reports that she has no history of similar and this started today. Patient is moaning and unable to provide any history. Nursing Notes were reviewed. REVIEW OF SYSTEMS    (2+ forlevel 4; 10+ for level 5)     Review of Systems  Unable to obtain    PAST MEDICAL HISTORY     Past Medical History:   Diagnosis Date    Cerebral palsy ([address]     Constipation     Dysphagia     mechanical soft diet, nectar thick liquids    Fracture     left leg brace per sister 1/2014    Intractable epilepsy ([address]     Mental retardation     Open wound 8/9/2019    Upper Gluteal Cleft    Osteoporosis     Recurrent UTI     Seizure disorder ([address]     seizures well controlled( last seizure 7-2012)    Wheelchair bound        SURGICAL HISTORY       Past Surgical History:   Procedure Laterality Date    CATARACT REMOVAL WITH IMPLANT  2/25/10    DENTAL SURGERY  9/01/10    dental rehabilitation    DENTAL SURGERY  12/5/12    dental rehab    DENTAL SURGERY  03/05/14    DENTAL REHABILITATION                        CURRENT MEDICATIONS       Previous Medications    ACETAMINOPHEN (TYLENOL) 325 MG TABLET    Take 650 mg by mouth every 6 hours as needed.       ACYCLOVIR (ZOVIRAX) 5 % OINTMENT Apply topically every 3 hours Apply topically every 3 hours. CALCIUM CARBONATE (OSCAL) 500 MG TABS TABLET    Take 500 mg by mouth 2 times daily. CHLORHEXIDINE (PERIDEX) 0.12 % SOLUTION    Take 15 mLs by mouth 2 times daily. CITALOPRAM (CELEXA) 10 MG TABLET    Take 20 mg by mouth daily     DEXTROMETHORPHAN-GUAIFENESIN (ROBITUSSIN DM PO)    Take 10 mLs by mouth every 4 hours as needed. DIAZEPAM (DIASTAT ACUDIAL) 10 MG GEL    Place  rectally once as needed. UP TO 15 MG RECTALLY PRN, any seizure greater than 5 min     DICYCLOMINE (BENTYL) 20 MG TABLET    Take 20 mg by mouth 2 times daily. DIVALPROEX (DEPAKOTE SPRINKLE) 125 MG CAPSULE    Take 2 capsules by mouth 2 times daily. DOCUSATE SODIUM (COLACE) 100 MG CAPSULE    Take 100 mg by mouth 2 times daily. FERROUS SULFATE 325 (65 FE) MG TABLET    Take 325 mg by mouth daily (with breakfast)    FUROSEMIDE (LASIX) 40 MG TABLET    Take 40 mg by mouth daily. GAUZE PADS & DRESSINGS (CURITY IODOFORM PACKING STRIP) MISC    Loosely pack into wound    GENTAMICIN (GARAMYCIN) 0.1 % CREAM    Apply topically daily. GUAIFENESIN (MUCINEX) 600 MG SR TABLET    Take 1,200 mg by mouth every 12 hours as needed for Congestion. IBUPROFEN (IBU) 600 MG TABLET    Take 1 tablet by mouth every 8 hours as needed for Pain. LAMOTRIGINE (LAMICTAL) 100 MG TABLET    Take 100 mg by mouth daily    LOPERAMIDE (IMODIUM) 2 MG CAPSULE    Take 2 mg by mouth 4 times daily as needed for Diarrhea    NYSTATIN IN AQUAPHOR OINTMENT    Apply  topically 2 times daily as needed. OMEPRAZOLE (PRILOSEC) 40 MG CAPSULE    Take 40 mg by mouth daily. POLYETHYLENE GLYCOL (GLYCOLAX) POWDER    Take 17 g by mouth Indications: 3 times a week MIX 1 TABLESPOON IN JUICE    POTASSIUM CHLORIDE ER 20 MEQ TBCR    Take 20 mEq by mouth daily. ZINC OXIDE (TRIAD HYDROPHILIC) PSTE PASTE    Apply 1 Tube topically daily       ALLERGIES     Macrobid [nitrofurantoin monohydrate macrocrystals];  Neosporin [neomycin-polymyxin-gramicidin]; Nitrofurantoin; Nitrofurantoin macrocrystal; Penicillins; and Sulfa antibiotics    FAMILY HISTORY     History reviewed. No pertinent family history. SOCIAL HISTORY       Social History     Socioeconomic History    Marital status: Single     Spouse name: None    Number of children: None    Years of education: None    Highest education level: None   Occupational History    None   Social Needs    Financial resource strain: None    Food insecurity:     Worry: None     Inability: None    Transportation needs:     Medical: None     Non-medical: None   Tobacco Use    Smoking status: Never Smoker    Smokeless tobacco: Never Used   Substance and Sexual Activity    Alcohol use: No    Drug use: No    Sexual activity: None   Lifestyle    Physical activity:     Days per week: None     Minutes per session: None    Stress: None   Relationships    Social connections:     Talks on phone: None     Gets together: None     Attends Congregational service: None     Active member of club or organization: None     Attends meetings of clubs or organizations: None     Relationship status: None    Intimate partner violence:     Fear of current or ex partner: None     Emotionally abused: None     Physically abused: None     Forced sexual activity: None   Other Topics Concern    None   Social History Narrative    None       SCREENINGS           PHYSICAL EXAM    (5+ for level 4, 8+ for level 5)     ED Triage Vitals   BP Temp Temp src Pulse Resp SpO2 Height Weight   -- -- -- -- -- -- -- --       Physical Exam  Vitals signs and nursing note reviewed. Constitutional:       General: She is in acute distress. Appearance: She is ill-appearing and toxic-appearing. She is not diaphoretic. HENT:      Head: Normocephalic and atraumatic. Right Ear: External ear normal.      Left Ear: External ear normal.      Nose: Nose normal. No congestion or rhinorrhea.       Mouth/Throat:      Mouth: flexed as well as her wrists which are flexed. This makes access impossible in her antecubital fossa bilaterally. Psychiatric:      Comments: Unable to assess         DIAGNOSTIC RESULTS     EKG: ordered but not able to be obtained by nursing as pt was critical and they were attempting to get vascular access on the pt. RADIOLOGY (Per Emergency Physician): Interpretation per the Radiologist below, if available at the time of this note:  Ct Abdomen Pelvis Wo Contrast Additional Contrast? None    Result Date: 1/24/2020  EXAMINATION: CT OF THE ABDOMEN AND PELVIS WITHOUT CONTRAST 1/24/2020 6:43 pm TECHNIQUE: CT of the abdomen and pelvis was performed without the administration of intravenous contrast. Multiplanar reformatted images are provided for review. Dose modulation, iterative reconstruction, and/or weight based adjustment of the mA/kV was utilized to reduce the radiation dose to as low as reasonably achievable. COMPARISON: None. HISTORY: ORDERING SYSTEM PROVIDED HISTORY: rigid abd, black emesis TECHNOLOGIST PROVIDED HISTORY: Additional Contrast?->None Reason for exam:->rigid abd, black emesis Reason for Exam: rigid abd, black emesis. Acuity: Acute Type of Exam: Initial FINDINGS:  images were obtained however the patient coded prior to obtaining axial imaging. The exam was thus terminated.  images demonstrate massive gaseous distention of the stomach and small bowel with a relative paucity of colonic gas consistent with distal small bowel obstruction. High-grade distal small bowel obstruction, etiology undetermined.        LABS:  Labs Reviewed   CBC WITH AUTO DIFFERENTIAL - Abnormal; Notable for the following components:       Result Value    WBC 14.3 (*)     RBC 5.29 (*)     MCH 25.1 (*)     MCHC 29.2 (*)     RDW 19.8 (*)     Platelets 193 (*)     Neutrophils Absolute 9.8 (*)     All other components within normal limits    Narrative:     Performed at:  [facility]  [address]   Phone [phone]   COMPREHENSIVE METABOLIC PANEL W/ REFLEX TO MG FOR LOW K - Abnormal; Notable for the following components:    CO2 14 (*)     Anion Gap 22 (*)     Glucose 291 (*)     BUN 38 (*)     Albumin/Globulin Ratio 1.0 (*)     All other components within normal limits    Narrative:     Usman Jiang  [facility] tel. [phone],  Chemistry results called to and read back by Cresencio Pinon, 01/24/2020  18:49, by [facility]  Performed at:  [facility]  [address]   Phone [phone]   TROPONIN - Abnormal; Notable for the following components:    Troponin 0.03 (*)     All other components within normal limits    Narrative:     Performed at:  [facility]  [address]   Phone [phone]   CULTURE BLOOD #1   CULTURE BLOOD #2   PROTIME-INR    Narrative:     Performed at:  [facility]  [address]   Phone [phone]   BLOOD OCCULT STOOL DIAGNOSTIC   LACTATE, SEPSIS   LACTATE, SEPSIS   URINE RT REFLEX TO CULTURE   TYPE AND SCREEN    Narrative:     Performed at:  [facility]  [address]   Phone [phone]       All other labs were within normal range or not returned as of this dictation.     EMERGENCY DEPARTMENT COURSE and DIFFERENTIAL DIAGNOSIS/MDM:   Vitals:    Vitals:    01/24/20 1806 01/24/20 1815 01/24/20 1830 01/24/20 1845   BP: 122/75 111/65 113/69    Pulse: 134 131 129    Resp: 20 27 (!) 35    Temp:       TempSrc:       SpO2: 93%  90%    Weight:    126 lb (57.2 kg)   Height:    4' 8\" (1.422 m)       Medications   pantoprazole (PROTONIX) injection 80 mg (has no administration in time range)   0.9 % sodium chloride bolus (has no administration in time range)   HYDROmorphone HCl PF (DILAUDID) injection 1 mg (has no administration immediately. Due to the fact that she has signed advanced directives stating no CPR or intubation and I had a discussion with her Sister Em Callahan who requested no CPR or intubation we did not perform the above. We did actively suction her airway however she lost pulses. Time of death was 46. I also discussed the case with her other Any Shaw when she arrived after the fact. We placed a call to Dr. Chayito Sosa her PCP and I spoke with with Dr. Pasquale Parker -who will relay the information to Dr. Chayito Sosa who will sign the death certificate. Note that I did review the CT results with 1 of the male family members and reported that she had a high-grade bowel obstruction per radiology. Note when I discussed the case with Em Callahan and Richie Vieyrahouse they reported that if the patient had a surgical process they would not have wanted the patient to have surgery. DNR CCA-DO NOT INTUBATE form was filled out and placed on the chart. CRITICAL CARE TIME: 80 minutes excluding billable procedure time: Management of patient with acute abdominal exam, GI bleed, multiple discussions with family members, discussions of CODE STATUS with family members, complex medical decision making, discussion with primary care provider. CONSULTS:  None    PROCEDURES:  1) Intraosseous line placement: Indication: poor iv access. No better we discussed risks and benefits including but not limited to bleeding, infection, bone damage. This was discussed with his sister who is his family member present. She does accept the risks. After sterilizing the area over the left anterior tibia, an intraosseous needle was drilled. I was able to aspirate marrow and was able to flush easily. Patient tolerated the procedure well with no complications. Procedures    FINAL IMPRESSION      1. Hematemesis with nausea    2. Generalized abdominal pain    3. Poor intravenous access    4.  Aspiration into respiratory tract, initial encounter

## 2020-01-25 NOTE — ED NOTES
Called and spoke with [facility]  home to make aware family requests pt be taken there upon release from hospital. Informed them that pt is still on a hold and is NOT ready for  at this time, but that someone will notify  home once pt is able to be picked up; verbalized understanding.       Gregor Alcantar RN  20 2030

## 2020-01-25 NOTE — ED NOTES
Dr. Ana Maria Aguilera with multiple attempts at IV access using ultrasound. Dr. Xuan Kapoor had lengthy conversation with pt's sister whom is at bedside. Code status was discussed by Dr. Ana Maria Aguilera (pt had Advance Directive form filled out stating that she did not want CPR, cardiac meds, meds that increase BP, defibrillation, intubation, or ventilator) with sister and confirmed that she was DNR and would not want CPR, any life saving medications, intubation, etc.     Orders were placed and Dr. Ana Maria Aguilera requested pt go directly to CT.       Vinnie Chriinos RN  01/24/20 2010

## 2020-01-25 NOTE — ED NOTES
Family states they would like to use [facility]  home in [facility]. [address], [address] [phone]. [facility] contacted, spoke with Laney Maher, hold placed on pt referral number [id number]. Wilfredo Lamas RN updated.  contacted, no hold placed per .       Valeria Doe RN  20 [id number]

## 2020-01-25 NOTE — ED NOTES
Fatoumata Martinez RN, spoke with Dr. Claudia Sawyer, pt's PCP, she will sign death certificate.       Jerrica Dao RN  01/24/20 1948

## 2020-01-25 NOTE — ED NOTES
During scan, pt had stopped fidgeting as she had been since she had been here. This RN asked if could stop CT scan to assess pt. Scan was immediately stopped and this RN in to assess pt. Upon assessing pt, she was vomiting and appeared gray in the face. This RN and CT tech pulled up pt's head off of bed and pt began to projectile vomit onto CT scanner and herself. This RN asked another CT tech to grab a MD to CT to evaluate pt.       Mark Jones RN  01/24/20 2015

## 2020-01-25 NOTE — ED NOTES
Spoke with Sung Sicard, body has been released per [employer].      Radha Baker RN  01/24/20 1947

## 2020-01-25 NOTE — ED NOTES
Pt arrives to ER via squad from Group home with caregiver. Per squad pt began with black emesis and stool about an hour ago. Caregiver was reporting pt was vomiting about every 15 minutes. Upon assessment of pt, abdomen very rigid. Pt unable to tell this RN what hurts/where hurts. Pt arrives with escalante catheter inserted. Pt also contracted in all 4 limbs. Pt continues to moan and cry uncontrollably.       Osorio Busch RN  01/24/20 1954

## 2024-01-01 NOTE — ED NOTES
Dr. Berry Jimenez to CT. No pulse noted. TOD called at [address] by [address]. Berry Jimenez. Witnessed by this RN and Pramod Gonzalez RN.       Radha Baker RN  01/24/20 1941 [id number]